# Patient Record
Sex: MALE | Race: WHITE | NOT HISPANIC OR LATINO | Employment: FULL TIME | URBAN - METROPOLITAN AREA
[De-identification: names, ages, dates, MRNs, and addresses within clinical notes are randomized per-mention and may not be internally consistent; named-entity substitution may affect disease eponyms.]

---

## 2017-10-10 ENCOUNTER — HOSPITAL ENCOUNTER (EMERGENCY)
Facility: HOSPITAL | Age: 27
Discharge: HOME | End: 2017-10-10
Attending: EMERGENCY MEDICINE | Admitting: EMERGENCY MEDICINE
Payer: COMMERCIAL

## 2017-10-10 ENCOUNTER — APPOINTMENT (EMERGENCY)
Dept: RADIOLOGY | Facility: HOSPITAL | Age: 27
End: 2017-10-10
Payer: COMMERCIAL

## 2017-10-10 ENCOUNTER — APPOINTMENT (EMERGENCY)
Dept: CT IMAGING | Facility: HOSPITAL | Age: 27
End: 2017-10-10
Payer: COMMERCIAL

## 2017-10-10 VITALS
RESPIRATION RATE: 18 BRPM | WEIGHT: 218.92 LBS | SYSTOLIC BLOOD PRESSURE: 119 MMHG | OXYGEN SATURATION: 98 % | HEIGHT: 72 IN | HEART RATE: 99 BPM | TEMPERATURE: 99.5 F | BODY MASS INDEX: 29.65 KG/M2 | DIASTOLIC BLOOD PRESSURE: 58 MMHG

## 2017-10-10 DIAGNOSIS — K62.89 RECTAL PAIN: Primary | ICD-10-CM

## 2017-10-10 LAB
ATRIAL RATE: 124 BPM
P AXIS: 65 DEGREES
PR INTERVAL: 162 MS
QRS AXIS: 86 DEGREES
QRSD INTERVAL: 84 MS
QT INTERVAL: 290 MS
QTC INTERVAL: 416 MS
T WAVE AXIS: 66 DEGREES
VENTRICULAR RATE: 124 BPM

## 2017-10-10 PROCEDURE — 93005 ELECTROCARDIOGRAM TRACING: CPT

## 2017-10-10 PROCEDURE — 72170 X-RAY EXAM OF PELVIS: CPT

## 2017-10-10 PROCEDURE — 74176 CT ABD & PELVIS W/O CONTRAST: CPT

## 2017-10-10 PROCEDURE — 99284 EMERGENCY DEPT VISIT MOD MDM: CPT
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
## 2017-10-10 NOTE — ED NOTES
Pt laying on left side, now c/o 5/10 rectal pain  Pt stated "I know its still in there, I even felt the tip myself"  Sinus Tach on monitor  Awaiting colonrectal surgeon call back   Will continue to monitor     Keturah Siddiqui RN  10/10/17 4605

## 2017-10-10 NOTE — ED NOTES
Pt provided verbal understanding of all discharge instructions   Pt ambulated to waiting room-steady gait noted     Dylan Lindo RN  10/10/17 9903

## 2017-10-10 NOTE — DISCHARGE INSTRUCTIONS
Rectal Pain   WHAT YOU NEED TO KNOW:   Rectal pain can be caused by a number of conditions, such as hemorrhoids, an abscess, trauma, or anal tear  Infection, muscle spasms, or anal intercourse can also cause rectal pain  DISCHARGE INSTRUCTIONS:   Medicines: You may need any of the following:  · NSAIDs , such as ibuprofen, help decrease swelling, pain, and fever  This medicine is available with or without a doctor's order  NSAIDs can cause stomach bleeding or kidney problems in certain people  If you take blood thinner medicine, always ask your healthcare provider if NSAIDs are safe for you  Always read the medicine label and follow directions  · Prescription pain medicine  may be given  Ask how to take this medicine safely  · Antibiotics  help treat or prevent a bacterial infection  · Bowel movement softeners  help soften your bowel movement  They help prevent straining and more damage to the area  · Take your medicine as directed  Contact your healthcare provider if you think your medicine is not helping or if you have side effects  Tell him or her if you are allergic to any medicine  Keep a list of the medicines, vitamins, and herbs you take  Include the amounts, and when and why you take them  Bring the list or the pill bottles to follow-up visits  Carry your medicine list with you in case of an emergency  Return to the emergency department if:   · You have severe pain  Contact your healthcare provider if:   · Your pain does not decrease after 1 to 2 days of treatment  · You cannot take the medicine prescribed for your condition  · You have questions or concerns about your condition or care  Take a sitz bath:  Fill a bathtub with 4 to 6 inches of warm water  You may also use a sitz bath pan that fits over a toilet  Sit in the sitz bath for 20 minutes  Do this 2 to 3 times a day, or as directed  The warm water can help decrease pain, muscle spasms, or swelling     Apply heat:  Apply a warm, moist compress on your anus for 20 to 30 minutes every 2 hours for as many days as directed  Heat helps decrease pain and muscle spasms  Eat high-fiber foods: This will help prevent constipation and soften your bowel movements  High-fiber foods include fruit, vegetables, whole-grain breads and cereals, and beans  A dietitian or healthcare provider can help you create a high-fiber meal plan  Drink liquids as directed: You may need to drink more liquid than usual to help soften your bowel movements  Ask how much liquid to drink each day and which liquids are best for you  Follow up with your healthcare provider as directed:  Write down your questions so you remember to ask them during your visits  © 2017 [facility] Information is for End User's use only and may not be sold, redistributed or otherwise used for commercial purposes  All illustrations and images included in CareNotes® are the copyrighted property of [employer]  or [employer]  The above information is an  only  It is not intended as medical advice for individual conditions or treatments  Talk to your doctor, nurse or pharmacist before following any medical regimen to see if it is safe and effective for you

## 2020-01-04 ENCOUNTER — HOSPITAL ENCOUNTER (EMERGENCY)
Facility: HOSPITAL | Age: 30
Discharge: HOME | End: 2020-01-04
Attending: EMERGENCY MEDICINE | Admitting: EMERGENCY MEDICINE
Payer: COMMERCIAL

## 2020-01-04 VITALS
DIASTOLIC BLOOD PRESSURE: 76 MMHG | WEIGHT: 200 LBS | SYSTOLIC BLOOD PRESSURE: 131 MMHG | TEMPERATURE: 98 F | HEART RATE: 83 BPM | OXYGEN SATURATION: 99 % | BODY MASS INDEX: 28 KG/M2 | RESPIRATION RATE: 18 BRPM | HEIGHT: 71 IN

## 2020-01-04 DIAGNOSIS — T15.91XA FOREIGN BODY OF RIGHT EYE: Primary | ICD-10-CM

## 2020-01-04 DIAGNOSIS — S05.00XA CORNEAL ABRASION: ICD-10-CM

## 2020-01-04 PROCEDURE — 99284 EMERGENCY DEPT VISIT MOD MDM: CPT | Performed by: EMERGENCY MEDICINE

## 2020-01-04 PROCEDURE — 99283 EMERGENCY DEPT VISIT LOW MDM: CPT

## 2020-01-04 RX ORDER — OFLOXACIN 3 MG/ML
1 SOLUTION/ DROPS OPHTHALMIC ONCE
Status: COMPLETED | OUTPATIENT
Start: 2020-01-04 | End: 2020-01-04

## 2020-01-04 RX ORDER — ALBUTEROL SULFATE 90 UG/1
2 AEROSOL, METERED RESPIRATORY (INHALATION) EVERY 6 HOURS PRN
COMMUNITY

## 2020-01-04 RX ORDER — CLONAZEPAM 1 MG/1
1 TABLET ORAL DAILY PRN
COMMUNITY

## 2020-01-04 RX ORDER — OFLOXACIN 3 MG/ML
1 SOLUTION/ DROPS OPHTHALMIC 4 TIMES DAILY
Qty: 5 ML | Refills: 0 | Status: SHIPPED | OUTPATIENT
Start: 2020-01-04 | End: 2020-01-09

## 2020-01-04 RX ADMIN — OFLOXACIN 1 DROP: 3 SOLUTION/ DROPS OPHTHALMIC at 09:04

## 2020-01-04 RX ADMIN — FLUORESCEIN SODIUM 1 STRIP: 1 STRIP OPHTHALMIC at 09:04

## 2020-01-04 NOTE — DISCHARGE INSTRUCTIONS
ED to follow up with an ophthalmologist   Mason Zuñiga do have a corneal abrasion along with a rust ring development    That needs to be followed up with a specialist   If any worsening of symptoms come back to emergency department next

## 2020-01-04 NOTE — ED PROVIDER NOTES
History  Chief Complaint   Patient presents with    Foreign Body in [address] was cutting scrap metal and got a piece in his rt eye  FB sensation in same  HPI    34 year male that presents today with a metal in his right eye  Patient states he was working with metal and felt a piece go in his eye  Patient states visible when he looks in the mirror  Patient states it is irritating  Denies any blurry vision  Patient states he does not wear any contacts  34 year male that presents with a metal in his right eye  Successfully removed with Q-tip along with a 27 gauge needle  Recommended that patient follows up with Ophthalmology    Prior to Admission Medications   Prescriptions Last Dose Informant Patient Reported? Taking? albuterol (PROVENTIL HFA,VENTOLIN HFA) 90 mcg/act inhaler   Yes Yes   Sig: Inhale 2 puffs every 6 (six) hours as needed for wheezing   clonazePAM (KlonoPIN) 1 mg tablet   Yes Yes   Sig: Take 1 mg by mouth daily as needed for seizures      Facility-Administered Medications: None       Past Medical History:   Diagnosis Date    Asthma        History reviewed  No pertinent surgical history  History reviewed  No pertinent family history  I have reviewed and agree with the history as documented  Social History     Tobacco Use    Smoking status: Current Every Day Smoker     Packs/day: 1 00    Smokeless tobacco: Never Used   Substance Use Topics    Alcohol use: Yes     Comment: weekends    Drug use: Yes     Types: Marijuana        Review of Systems   Constitutional: Negative  Negative for diaphoresis and fever  HENT: Negative  Eyes: Positive for pain and redness  Negative for visual disturbance  Respiratory: Negative  Negative for cough, shortness of breath and wheezing  Cardiovascular: Negative  Negative for chest pain, palpitations and leg swelling  Gastrointestinal: Negative for abdominal distention, abdominal pain, nausea and vomiting     Genitourinary: Negative  Musculoskeletal: Negative  Skin: Negative  Neurological: Negative  Psychiatric/Behavioral: Negative  All other systems reviewed and are negative  Physical Exam  Physical Exam   Constitutional: He is oriented to person, place, and time  He appears well-developed and well-nourished  No distress  HENT:   Head: Normocephalic and atraumatic  Nose: Nose normal    Mouth/Throat: Oropharynx is clear and moist    Eyes: Pupils are equal, round, and reactive to light  Conjunctivae and EOM are normal        Small spot of metal right eye   Neck: Normal range of motion  Neck supple  Cardiovascular: Normal rate, regular rhythm and normal heart sounds  No murmur heard  Pulmonary/Chest: Effort normal and breath sounds normal  No respiratory distress  He has no wheezes  He has no rales  Abdominal: Soft  Bowel sounds are normal  He exhibits no distension  There is no tenderness  There is no rebound and no guarding  Musculoskeletal: Normal range of motion  He exhibits no edema, tenderness or deformity  Neurological: He is alert and oriented to person, place, and time  No cranial nerve deficit  Skin: Skin is warm and dry  No rash noted  He is not diaphoretic  No pallor  Psychiatric: He has a normal mood and affect  Vitals reviewed        Vital Signs  ED Triage Vitals   Temperature Pulse Respirations Blood Pressure SpO2   01/04/20 0801 01/04/20 0801 01/04/20 0801 01/04/20 0801 01/04/20 0801   98 °F (36 7 °C) 83 18 131/76 99 %      Temp Source Heart Rate Source Patient Position - Orthostatic VS BP Location FiO2 (%)   01/04/20 0801 01/04/20 0801 01/04/20 0801 01/04/20 0801 --   Tympanic Monitor Lying Left arm       Pain Score       01/04/20 0901       No Pain           Vitals:    01/04/20 0801   BP: 131/76   Pulse: 83   Patient Position - Orthostatic VS: Lying         Visual Acuity  Visual Acuity      Most Recent Value   Visual acuity R eye is  20/30   Visual acuity Left eye is  20/20 Visual acuity in both eyes is  20/20   Wearing corrective eyewear/lenses? No   No corrective eyewear/lenses  Yes          ED Medications  Medications   ofloxacin (OCUFLOX) 0 3 % ophthalmic solution 1 drop (1 drop Right Eye Given 1/4/20 0904)   fluorescein sodium sterile ophthalmic strip 1 strip (1 strip Right Eye Given 1/4/20 0904)       Diagnostic Studies  Results Reviewed     None                 No orders to display              Procedures  Foreign Body - Ocular  Date/Time: 1/4/2020 9:00 AM  Performed by: Nicki Luna MD  Authorized by: Nicki Luna MD     Patient location:  ED  Consent:     Consent obtained:  Verbal    Consent given by:  Patient    Risks discussed:  Bleeding, globe perforation, pain, visual impairment, incomplete removal, corneal damage, damage to surrounding structures, infection and worsening of condition    Alternatives discussed:  No treatment, delayed treatment, observation and referral  Universal protocol:     Patient identity confirmed:  Verbally with patient  Location:     Location:  [facility]    Depth:  Embedded  Pre-procedure details:     Imaging:  None    Fluorescein exam: yes      Fluorescein uptake: yes    Anesthesia (see MAR for exact dosages):     Local anesthetic:  Tetracaine drops  Procedure details:     Localization method:  Direct visualization    Removal mechanism:  27 G needle    Foreign bodies recovered:  1    Intact foreign body removal: yes      Residual rust ring observed: yes      Residual rust ring removed: no    Post-procedure details:     Confirmation:  No additional foreign bodies on visualization    Dressing:  Antibiotic ointment    Patient tolerance of procedure: Tolerated well, no immediate complications             ED Course  ED Course as of Jan 04 1353   Sat Jan 04, 2020   0906 Metal removed       0845 Removed with a Q-tip moist along with a 27 gauge needle    Successfully removed a rust ring is present discussed with patient following up with ophthalmologist   Will write for antibiotics for the eye      0907 Corneal abrasion will give antibiotics                                   MDM      Disposition  Final diagnoses:   Foreign body of right eye   Corneal abrasion     Time reflects when diagnosis was documented in both MDM as applicable and the Disposition within this note     Time User Action Codes Description Comment    1/4/2020  9:08 AM Ilir Farris Add Bhavik Mail Foreign body of right eye     1/4/2020  9:08 AM Ilir Farris Add [S05 00XA] Corneal abrasion       ED Disposition     ED Disposition Condition Date/Time Comment    Discharge Stable Sat Jan 4, 2020  9:07 AM Ivie Baumgarten discharge to home/self care  Follow-up Information     Follow up With Specialties Details Why [address] Ophthalmology Schedule an appointment as soon as possible for a visit   [address]  [phone]            Discharge Medication List as of 1/4/2020  9:09 AM      START taking these medications    Details   ofloxacin (OCUFLOX) 0 3 % ophthalmic solution Administer 1 drop to the right eye 4 (four) times a day for 5 days, Starting Sat 1/4/2020, Until Thu 1/9/2020, Print         CONTINUE these medications which have NOT CHANGED    Details   albuterol (PROVENTIL HFA,VENTOLIN HFA) 90 mcg/act inhaler Inhale 2 puffs every 6 (six) hours as needed for wheezing, Historical Med      clonazePAM (KlonoPIN) 1 mg tablet Take 1 mg by mouth daily as needed for seizures, Historical Med           No discharge procedures on file      ED Provider  Electronically Signed by           Rob Clifton MD  01/04/20 5669

## 2021-03-26 ENCOUNTER — OFFICE VISIT (OUTPATIENT)
Dept: URGENT CARE | Facility: CLINIC | Age: 31
End: 2021-03-26
Payer: COMMERCIAL

## 2021-03-26 VITALS
BODY MASS INDEX: 28.58 KG/M2 | SYSTOLIC BLOOD PRESSURE: 145 MMHG | HEART RATE: 74 BPM | HEIGHT: 72 IN | OXYGEN SATURATION: 99 % | RESPIRATION RATE: 16 BRPM | DIASTOLIC BLOOD PRESSURE: 65 MMHG | TEMPERATURE: 98.1 F | WEIGHT: 211 LBS

## 2021-03-26 DIAGNOSIS — L02.414 ABSCESS OF LEFT ARM: Primary | ICD-10-CM

## 2021-03-26 PROCEDURE — 99213 OFFICE O/P EST LOW 20 MIN: CPT | Performed by: PHYSICIAN ASSISTANT

## 2021-03-26 RX ORDER — SULFAMETHOXAZOLE AND TRIMETHOPRIM 800; 160 MG/1; MG/1
1 TABLET ORAL EVERY 12 HOURS SCHEDULED
Qty: 20 TABLET | Refills: 0 | Status: SHIPPED | OUTPATIENT
Start: 2021-03-26 | End: 2021-03-26 | Stop reason: SDUPTHER

## 2021-03-26 RX ORDER — SULFAMETHOXAZOLE AND TRIMETHOPRIM 800; 160 MG/1; MG/1
1 TABLET ORAL EVERY 12 HOURS SCHEDULED
Qty: 20 TABLET | Refills: 0 | Status: SHIPPED | OUTPATIENT
Start: 2021-03-26 | End: 2021-04-05

## 2021-03-26 NOTE — PATIENT INSTRUCTIONS
Abscess of left arm related to IV drug use  rx bactrim DS twice daily x 10 days sent via EMR  Warm compress  Tylenol/ibuprofen as needed for pain/fever    Follow up with PCP in 3-5 days  Proceed to  ER if symptoms worsen  Abscess   WHAT YOU NEED TO KNOW:   A warm compress may help your abscess drain  Your healthcare provider may make a cut in the abscess so it can drain  You may need surgery to remove an abscess that is on your hands or buttocks  DISCHARGE INSTRUCTIONS:   Return to the emergency department if:   · The area around your abscess becomes very painful, warm, or has red streaks  · You have a fever and chills  · Your heart is beating faster than usual      · You feel faint or confused  Contact your healthcare provider if:   · Your abscess gets bigger or does not get better  · Your abscess returns  · You have questions or concerns about your condition or care  Medicines: You may  need any of the following:  · Antibiotics  help treat a bacterial infection  · Acetaminophen  decreases pain and fever  It is available without a doctor's order  Ask how much to take and how often to take it  Follow directions  Acetaminophen can cause liver damage if not taken correctly  · NSAIDs , such as ibuprofen, help decrease swelling, pain, and fever  This medicine is available with or without a doctor's order  NSAIDs can cause stomach bleeding or kidney problems in certain people  If you take blood thinner medicine, always ask your healthcare provider if NSAIDs are safe for you  Always read the medicine label and follow directions  · Take your medicine as directed  Contact your healthcare provider if you think your medicine is not helping or if you have side effects  Tell him or her if you are allergic to any medicine  Keep a list of the medicines, vitamins, and herbs you take  Include the amounts, and when and why you take them  Bring the list or the pill bottles to follow-up visits  Carry your medicine list with you in case of an emergency  Self-care:   · Apply a warm compress to your abscess  This will help it open and drain  Wet a washcloth in warm, but not hot, water  Apply the compress for 10 minutes  Repeat this 4 times each day  Do not  press on an abscess or try to open it with a needle  You may push the bacteria deeper or into your blood  · Do not share your clothes, towels, or sheets with anyone  This can spread the infection to others  · Wash your hands often  This can help prevent the spread of germs  Use soap and water or an alcohol-based hand rub  Care for your wound after it is drained:   · Care for your wound as directed  If your healthcare provider says it is okay, carefully remove the bandage and gauze packing  You may need to soak the gauze to get it out of your wound  Clean your wound and the area around it as directed  Dry the area and put on new, clean bandages  Change your bandages when they get wet or dirty  · Ask your healthcare provider how to change the gauze in your wound  Keep track of how many pieces of gauze are placed inside the wound  Do not put too much packing in the wound  Do not pack the gauze too tightly in your wound  Follow up with your healthcare provider in 1 to 3 days: You may need to have your packing removed or your bandage changed  Write down your questions so you remember to ask them during your visits  © Copyright [address] Information is for End User's use only and may not be sold, redistributed or otherwise used for commercial purposes  All illustrations and images included in CareNotes® are the copyrighted property of [employer] M , Inc  or [facility] Gerri Tam   The above information is an  only  It is not intended as medical advice for individual conditions or treatments  Talk to your doctor, nurse or pharmacist before following any medical regimen to see if it is safe and effective for you

## 2021-03-26 NOTE — PROGRESS NOTES
[facility]        NAME: Rosita Garcia is a 31 y o  male  : 1990    MRN: [id number]  DATE: 2021  TIME: 10:14 AM    Assessment and Plan   Abscess of left arm [L02 414]  1  Abscess of left arm  sulfamethoxazole-trimethoprim (BACTRIM DS) 800-160 mg per tablet     Patient Instructions   Abscess of left arm related to IV drug use  rx bactrim DS twice daily x 10 days sent via EMR  Warm compress  Tylenol/ibuprofen as needed for pain/fever    Follow up with PCP in 3-5 days  Proceed to  ER if symptoms worsen  Chief Complaint     Chief Complaint   Patient presents with    Abscess     Pt reports of abscess on left forearm started approx 1 week ago  History of Present Illness         Ron Chan is a 28-year-old male who presents to clinic with complaints of left forearm swelling  And redness x1 week  He states that it began as a tiny pimple like lesion and has progressively increased in size over the last week  He is also notes it is become more painful over that same week  He states that it is hard and red now overlying it  He denies any fever, chills, or known bite  He does admit to IV drug use in that vicinity on his left forearm  He states that the needle was clean and does not sure needles  He denies any fever, chills, chest pain, or shortness of breath  Review of Systems   Review of Systems   Constitutional: Negative for chills and fever  Respiratory: Negative for shortness of breath  Cardiovascular: Negative for chest pain  Skin: Positive for color change  Negative for wound       Current Medications       Current Outpatient Medications:     albuterol (PROVENTIL HFA,VENTOLIN HFA) 90 mcg/act inhaler, Inhale 2 puffs every 6 (six) hours as needed for wheezing, Disp: , Rfl:     clonazePAM (KlonoPIN) 1 mg tablet, Take 1 mg by mouth daily as needed for seizures, Disp: , Rfl:     sulfamethoxazole-trimethoprim (BACTRIM DS) 800-160 mg per tablet, Take 1 tablet by mouth every 12 (twelve) hours for 10 days, Disp: 20 tablet, Rfl: 0    Current Allergies     Allergies as of 03/26/2021    (No Known Allergies)            The following portions of the patient's history were reviewed and updated as appropriate: allergies, current medications, past family history, past medical history, past social history, past surgical history and problem list      Past Medical History:   Diagnosis Date    ADHD (attention deficit hyperactivity disorder)     Asthma        History reviewed  No pertinent surgical history  History reviewed  No pertinent family history  Medications have been verified  Objective   /65   Pulse 74   Temp 98 1 °F (36 7 °C)   Resp 16   Ht 6' (1 829 m)   Wt 95 7 kg (211 lb)   SpO2 99%   BMI 28 62 kg/m²   No LMP for male patient  Physical Exam     Physical Exam  Vitals signs and nursing note reviewed  Constitutional:       General: He is not in acute distress  Appearance: Normal appearance  He is not ill-appearing  Cardiovascular:      Rate and Rhythm: Normal rate and regular rhythm  Heart sounds: Normal heart sounds  Pulmonary:      Effort: Pulmonary effort is normal       Breath sounds: Normal breath sounds  Musculoskeletal:        Arms:    Neurological:      Mental Status: He is alert and oriented to person, place, and time     Psychiatric:         Mood and Affect: Mood normal          Behavior: Behavior normal

## 2021-04-06 ENCOUNTER — OFFICE VISIT (OUTPATIENT)
Dept: URGENT CARE | Facility: CLINIC | Age: 31
End: 2021-04-06
Payer: COMMERCIAL

## 2021-04-06 VITALS
HEIGHT: 72 IN | OXYGEN SATURATION: 100 % | HEART RATE: 82 BPM | RESPIRATION RATE: 18 BRPM | WEIGHT: 212.6 LBS | BODY MASS INDEX: 28.79 KG/M2 | TEMPERATURE: 97.5 F | SYSTOLIC BLOOD PRESSURE: 131 MMHG | DIASTOLIC BLOOD PRESSURE: 75 MMHG

## 2021-04-06 DIAGNOSIS — R22.0 LIP SWELLING: ICD-10-CM

## 2021-04-06 DIAGNOSIS — T78.40XA ALLERGIC REACTION, INITIAL ENCOUNTER: Primary | ICD-10-CM

## 2021-04-06 PROCEDURE — 99213 OFFICE O/P EST LOW 20 MIN: CPT | Performed by: PHYSICIAN ASSISTANT

## 2021-04-06 RX ORDER — PREDNISONE 50 MG/1
50 TABLET ORAL DAILY
Qty: 5 TABLET | Refills: 0 | Status: SHIPPED | OUTPATIENT
Start: 2021-04-06 | End: 2021-04-11

## 2021-04-06 NOTE — PROGRESS NOTES
[facility]        NAME: Raissa Dave is a 31 y o  male  : 1990    MRN: [id number]  DATE: 2021  TIME: 9:55 AM    Assessment and Plan   Allergic reaction, initial encounter [T78 40XA]  1  Allergic reaction, initial encounter  predniSONE 50 mg tablet   2  Lip swelling           Patient Instructions   Allergic reaction  rx prednisone once daily x 5 days sent via EMR, do not take at night  Can continue benadryl at night as needed  vaseline to lips as needed    Follow up with PCP in 3-5 days  Proceed to  ER if symptoms worsen  Chief Complaint     Chief Complaint   Patient presents with    Allergic Reaction     allergic reaction from unknown food? or enviorment on   Lips swollen begining  night now still swollen and excoriated  Took benadryl and ceflofloxin           History of Present Illness         Layton Pollard is a 72-year-old male who presents to clinic complaining of which lip swelling x3 days  He states that he started with the upper and lower lip swelling after  dinner on   He denies any new foods or medications  Prior to the swelling starting  He states that as a kid he got hives frequently for various allergies  Her pain notes the swelling is slightly present improved but now his lips are chapped and excoriated  He states that they are very itchy  He took Benadryl last night with some improvement  Review of Systems   Review of Systems   Constitutional: Negative  HENT: Positive for facial swelling  Respiratory: Negative for choking, shortness of breath, wheezing and stridor        Current Medications       Current Outpatient Medications:     clonazePAM (KlonoPIN) 1 mg tablet, Take 1 mg by mouth daily as needed for seizures, Disp: , Rfl:     albuterol (PROVENTIL HFA,VENTOLIN HFA) 90 mcg/act inhaler, Inhale 2 puffs every 6 (six) hours as needed for wheezing, Disp: , Rfl:     predniSONE 50 mg tablet, Take 1 tablet (50 mg total) by mouth daily for 5 days, Disp: 5 tablet, Rfl: 0    Current Allergies     Allergies as of 04/06/2021    (No Known Allergies)            The following portions of the patient's history were reviewed and updated as appropriate: allergies, current medications, past family history, past medical history, past social history, past surgical history and problem list      Past Medical History:   Diagnosis Date    ADHD (attention deficit hyperactivity disorder)     Asthma        No past surgical history on file  No family history on file  Medications have been verified  Objective   /75   Pulse 82   Temp 97 5 °F (36 4 °C)   Resp 18   Ht 6' (1 829 m)   Wt 96 4 kg (212 lb 9 6 oz)   SpO2 100%   BMI 28 83 kg/m²   No LMP for male patient  Physical Exam     Physical Exam  Vitals signs and nursing note reviewed  Constitutional:       General: He is not in acute distress  Appearance: Normal appearance  He is not ill-appearing  HENT:      Mouth/Throat:      Lips: Lesions (upper and lower lip excoriations and scabbed, +some edema as well, no signs of infection or bleeding) present  Cardiovascular:      Rate and Rhythm: Normal rate and regular rhythm  Heart sounds: Normal heart sounds  Pulmonary:      Effort: Pulmonary effort is normal       Breath sounds: Normal breath sounds  Neurological:      Mental Status: He is alert and oriented to person, place, and time     Psychiatric:         Mood and Affect: Mood normal          Behavior: Behavior normal

## 2021-04-06 NOTE — LETTER
April 6, 2021     Patient: Raissa Dave   YOB: 1990   Date of Visit: 4/6/2021       To Whom It May Concern: It is my medical opinion that Raissa Dave may return to work on 4/7/21  If you have any questions or concerns, please don't hesitate to call           Sincerely,        Dave Nava PA-C    CC: No Recipients

## 2021-04-06 NOTE — PATIENT INSTRUCTIONS
Allergic reaction  rx prednisone once daily x 5 days sent via EMR, no not take at night  Can continue benadryl at night as needed  vaseline to lips as needed    Follow up with PCP in 3-5 days  Proceed to  ER if symptoms worsen  General Allergic Reaction   WHAT YOU NEED TO KNOW:   An allergic reaction is your body's response to an allergen  Allergens include medicines, food, insect stings, animal dander, mold, latex, chemicals, and dust mites  Pollen from trees, grass, and weeds can also cause an allergic reaction  An allergic reaction can range from mild to severe  DISCHARGE INSTRUCTIONS:   Call 911 for signs or symptoms of anaphylaxis,  such as trouble breathing, swelling in your mouth or throat, or wheezing  You may also have itching, a rash, hives, or feel like you are going to faint  Return to the emergency department if:   · You have a skin rash, hives, swelling, or itching that is starting to get worse  · Your throat tightens, or your lips or tongue swell  · You have trouble swallowing or speaking  · You have worsening nausea, diarrhea, or abdominal cramps, or you are vomiting  · You have chest pain or tightness  Contact your healthcare provider if:   · You have questions or concerns about your condition or care  Medicines: You may need any of the following:  · Medicines  may be given to relieve certain allergy symptoms such as itching, sneezing, and swelling  You may take them as a pill or use drops in your nose or eyes  Topical treatments may be given to put directly on your skin to help decrease itching or swelling  · Epinephrine  may be prescribed if you are at risk for anaphylaxis  This is a severe allergic reaction that can be life-threatening  Your healthcare provider will tell you if you need to keep epinephrine with you  You will be taught when and how to use it  · Take your medicine as directed    Contact your healthcare provider if you think your medicine is not helping or if you have side effects  Tell him of her if you are allergic to any medicine  Keep a list of the medicines, vitamins, and herbs you take  Include the amounts, and when and why you take them  Bring the list or the pill bottles to follow-up visits  Carry your medicine list with you in case of an emergency  Follow up with your healthcare provider as directed:  Write down your questions so you remember to ask them during your visits  Manage your symptoms:   · Avoid allergens  You may need to have allergy testing with your healthcare provider or a specialist to find your allergens  · Use cold compresses on your skin or eyes  This will help soothe skin or eyes affected by the allergic reaction  You can make a cold compress by soaking a washcloth in cool water  Wring out the extra water before you apply the washcloth  · Rinse your nasal passages with a saline solution  Daily rinsing may help clear allergens out of your nose  Use distilled water if possible  You can also boil tap water and then let it cool before you use it  Do not use tap water without boiling it first     · Do not smoke  Nicotine and other chemicals in cigarettes and cigars can make an allergic reaction worse, and can also cause lung damage  Ask your healthcare provider for information if you currently smoke and need help to quit  E-cigarettes or smokeless tobacco still contain nicotine  Talk to your healthcare provider before you use these products  © Copyright [address] Information is for End User's use only and may not be sold, redistributed or otherwise used for commercial purposes  All illustrations and images included in CareNotes® are the copyrighted property of A D A M , Inc  or [address] [place] [address] [place]   The above information is an  only  It is not intended as medical advice for individual conditions or treatments   Talk to your doctor, nurse or pharmacist before following any medical regimen to see if it is safe and effective for you

## 2021-05-25 ENCOUNTER — TRANSCRIBE ORDERS (OUTPATIENT)
Dept: ADMINISTRATIVE | Facility: HOSPITAL | Age: 31
End: 2021-05-25

## 2021-05-25 ENCOUNTER — LAB (OUTPATIENT)
Dept: LAB | Facility: HOSPITAL | Age: 31
End: 2021-05-25
Payer: COMMERCIAL

## 2021-05-25 DIAGNOSIS — Z31.441 ENCOUNTER FOR TESTING OF MALE PARTNER OF FEMALE WITH RECURRENT PREGNANCY LOSS: Primary | ICD-10-CM

## 2021-05-25 DIAGNOSIS — Z31.441 ENCOUNTER FOR TESTING OF MALE PARTNER OF FEMALE WITH RECURRENT PREGNANCY LOSS: ICD-10-CM

## 2021-05-25 LAB
ABO GROUP BLD: NORMAL
BLD GP AB SCN SERPL QL: NEGATIVE
RH BLD: POSITIVE
SPECIMEN EXPIRATION DATE: NORMAL

## 2021-05-25 PROCEDURE — 86850 RBC ANTIBODY SCREEN: CPT

## 2021-05-25 PROCEDURE — 86900 BLOOD TYPING SEROLOGIC ABO: CPT

## 2021-05-25 PROCEDURE — 86901 BLOOD TYPING SEROLOGIC RH(D): CPT

## 2021-05-25 PROCEDURE — 36415 COLL VENOUS BLD VENIPUNCTURE: CPT

## 2021-07-19 ENCOUNTER — HOSPITAL ENCOUNTER (EMERGENCY)
Facility: HOSPITAL | Age: 31
Discharge: HOME | End: 2021-07-19
Attending: EMERGENCY MEDICINE | Admitting: EMERGENCY MEDICINE
Payer: COMMERCIAL

## 2021-07-19 VITALS
BODY MASS INDEX: 27.12 KG/M2 | TEMPERATURE: 98.4 F | SYSTOLIC BLOOD PRESSURE: 146 MMHG | RESPIRATION RATE: 14 BRPM | DIASTOLIC BLOOD PRESSURE: 92 MMHG | HEART RATE: 78 BPM | OXYGEN SATURATION: 100 % | WEIGHT: 200 LBS

## 2021-07-19 DIAGNOSIS — T40.1X1A HEROIN OVERDOSE (HCC): Primary | ICD-10-CM

## 2021-07-19 PROCEDURE — 99284 EMERGENCY DEPT VISIT MOD MDM: CPT | Performed by: PHYSICIAN ASSISTANT

## 2021-07-19 PROCEDURE — 99284 EMERGENCY DEPT VISIT MOD MDM: CPT

## 2021-07-19 RX ORDER — ONDANSETRON 2 MG/ML
1 INJECTION INTRAMUSCULAR; INTRAVENOUS ONCE
Status: COMPLETED | OUTPATIENT
Start: 2021-07-19 | End: 2021-07-19

## 2021-07-19 RX ORDER — NALOXONE HYDROCHLORIDE 1 MG/ML
1 INJECTION PARENTERAL ONCE
Status: COMPLETED | OUTPATIENT
Start: 2021-07-19 | End: 2021-07-19

## 2021-07-19 NOTE — ED PROVIDER NOTES
HPI: Patient is a 36 y o  male who presents with 1 days of drug overdose  which the patient describes at mild The patient has been given narcan  Patient nasal Heroin  Weekly use of opiates  4 bags per week typically and 4 bags used today  This was not an intentional overdose but recreational no plans or thoughts of suicide  Social History     Substance and Sexual Activity   Drug Use Yes    Types: Marijuana, Heroin      Nursing notes reviewed  Physical Exam:  ED Triage Vitals [07/19/21 1938]   Temperature Pulse Respirations Blood Pressure SpO2   98 4 °F (36 9 °C) 71 14 146/92 100 %      Temp Source Heart Rate Source Patient Position - Orthostatic VS BP Location FiO2 (%)   Tympanic Monitor Sitting Left arm --      Pain Score       --           ROS: Positive for heroin abuse, the remainder of a 10 organ system ROS was otherwise unremarkable  General: awake, alert, no acute distress    Head: normocephalic, atraumatic    Eyes: no scleral icterus  Ears: external ears normal, hearing grossly intact  Nose: external exam grossly normal, Negative nasal discharge  Neck: symmetric, No JVD noted, trachea midline  Pulmonary: no respiratory distress, no tachypnea noted  Cardiovascular: appears well perfused  Abdomen: no distention noted  Musculoskeletal: no deformities noted, tone normal  Neuro: grossly non-focal  Psych: mood and affect appropriate      Discussed with patient about their substance abuse, offered rehabilitation services to the patient including host referral and warm handoff  Patient declines at this point time but is requesting outpatient resources  Counseled on the importance sobriety and following up as outpatient  Patient was observed in the emergency department after receiving pre-hospital naloxone  The patient was medically stable for discharge after a period of 1 hour of observation    Patient was deemed low risk for adverse events after naloxone administration because they met following six criteria based upon Hospital Observation Upon Reversal(HOUR) rule:    Can mobilize as usual  Have a normal [id number] (>95%)  Have a normal respiratory rate (>10 and <20 breaths/min)  Have a normal temperature (>35 0 and <37 5°C)  Have a normal heart rate (>50 and <100 beats/min)  Have a GCS score of 15    [facility]  2019 Jan;26(1):7-15  doi: 10 1111/acem  [id number]  Epub 2018 Dec 28  Procedure  Procedures    ED Course as of Jul 21 0806 Mon Jul 19, 2021 2003 Patient signed out pending 26 more minutes of observation          Pt re-examined and evaluated after testing and treatment  Spoke with the patient and feeling improved and sxs have resolved  Will discharge home with close f/u with pcp and instructed to return to the ED if sxs worsen or continue  Pt agrees with the plan for discharge and feels comfortable to go home with proper f/u  Advised to return for worsening or additional problems  Diagnostic tests were reviewed and questions answered  Diagnosis, care plan and treatment options were discussed  The patient understand instructions and will follow up as directed  Counseling: I had a detailed discussion with the patient and/or guardian regarding: the historical points, exam findings, and any diagnostic results supporting the discharge diagnosis, lab results, radiology results, discharge instructions reviewed with patient and/or family/caregiver and understanding was verbalized  Instructions given to return to the emergency department if symptoms worsen or persist, or if there are any questions or concerns that arise at home       All labs reviewed and utilized in the medical decision making process    All radiology studies independently viewed by me and interpreted by the radiologist       Medications   naloxone (FOR EMS ONLY) [facility]) 2 MG/2ML injection 2 mg (0 mg Does not apply Given to EMS 7/19/21 1934)   ondansetron (FOR EMS ONLY) (ZOFRAN) 4 mg/2 mL injection 4 mg (0 mg Does not apply Given to EMS 7/19/21 1934)     Final diagnoses:   Heroin overdose [facility])     Time reflects when diagnosis was documented in both MDM as applicable and the Disposition within this note     Time User Action Codes Description Comment    7/19/2021  7:42 PM Paul, FaisalMadelyn [facility]  Heroin overdose [facility])       ED Disposition     ED Disposition Condition Date/Time Comment    Discharge Stable Mon Jul 19, 2021  7:42 PM Yodit Cormier discharge to home/self care  Follow-up Information     Follow up With Specialties Details Why Contact Info    Laurel Zuniga DO Internal Medicine Schedule an appointment as soon as possible for a visit in 2 days As needed [address] [facility] [address]  [phone]          Discharge Medication List as of 7/19/2021  8:27 PM      CONTINUE these medications which have NOT CHANGED    Details   albuterol (PROVENTIL HFA,VENTOLIN HFA) 90 mcg/act inhaler Inhale 2 puffs every 6 (six) hours as needed for wheezing, Historical Med      clonazePAM (KlonoPIN) 1 mg tablet Take 1 mg by mouth daily as needed for seizures, Historical Med           No discharge procedures on file      Electronically Signed by         Bipin Nunez PA-C  07/21/21 9225

## 2021-11-03 ENCOUNTER — OFFICE VISIT (OUTPATIENT)
Dept: FAMILY MEDICINE CLINIC | Facility: CLINIC | Age: 31
End: 2021-11-03
Payer: COMMERCIAL

## 2021-11-03 VITALS
DIASTOLIC BLOOD PRESSURE: 82 MMHG | WEIGHT: 243.6 LBS | RESPIRATION RATE: 18 BRPM | HEIGHT: 72 IN | SYSTOLIC BLOOD PRESSURE: 130 MMHG | BODY MASS INDEX: 33 KG/M2 | HEART RATE: 80 BPM | OXYGEN SATURATION: 98 %

## 2021-11-03 DIAGNOSIS — J45.20 MILD INTERMITTENT ASTHMA WITHOUT COMPLICATION: ICD-10-CM

## 2021-11-03 DIAGNOSIS — Z00.00 ANNUAL PHYSICAL EXAM: ICD-10-CM

## 2021-11-03 DIAGNOSIS — F41.9 ANXIETY: ICD-10-CM

## 2021-11-03 DIAGNOSIS — F19.20 DRUG DEPENDENCE (HCC): ICD-10-CM

## 2021-11-03 DIAGNOSIS — F98.8 ATTENTION DEFICIT DISORDER, UNSPECIFIED HYPERACTIVITY PRESENCE: ICD-10-CM

## 2021-11-03 DIAGNOSIS — Z00.00 PREVENTATIVE HEALTH CARE: Primary | ICD-10-CM

## 2021-11-03 PROCEDURE — 3008F BODY MASS INDEX DOCD: CPT | Performed by: FAMILY MEDICINE

## 2021-11-03 PROCEDURE — 3725F SCREEN DEPRESSION PERFORMED: CPT | Performed by: FAMILY MEDICINE

## 2021-11-03 PROCEDURE — 99395 PREV VISIT EST AGE 18-39: CPT | Performed by: FAMILY MEDICINE

## 2021-11-03 PROCEDURE — 4004F PT TOBACCO SCREEN RCVD TLK: CPT | Performed by: FAMILY MEDICINE

## 2021-11-03 RX ORDER — DEXTROAMPHETAMINE SACCHARATE, AMPHETAMINE ASPARTATE, DEXTROAMPHETAMINE SULFATE AND AMPHETAMINE SULFATE 7.5; 7.5; 7.5; 7.5 MG/1; MG/1; MG/1; MG/1
30 TABLET ORAL 2 TIMES DAILY
COMMUNITY

## 2021-11-10 ENCOUNTER — TELEPHONE (OUTPATIENT)
Dept: PSYCHIATRY | Facility: CLINIC | Age: 31
End: 2021-11-10

## 2021-11-20 ENCOUNTER — HOSPITAL ENCOUNTER (EMERGENCY)
Facility: HOSPITAL | Age: 31
Discharge: HOME | End: 2021-11-20
Attending: EMERGENCY MEDICINE
Payer: COMMERCIAL

## 2021-11-20 VITALS
WEIGHT: 230 LBS | BODY MASS INDEX: 31.19 KG/M2 | TEMPERATURE: 98 F | RESPIRATION RATE: 18 BRPM | DIASTOLIC BLOOD PRESSURE: 80 MMHG | HEART RATE: 65 BPM | OXYGEN SATURATION: 99 % | SYSTOLIC BLOOD PRESSURE: 155 MMHG

## 2021-11-20 DIAGNOSIS — F19.10 POLYSUBSTANCE ABUSE (HCC): Primary | ICD-10-CM

## 2021-11-20 DIAGNOSIS — F11.23 OPIATE WITHDRAWAL (HCC): ICD-10-CM

## 2021-11-20 DIAGNOSIS — G25.81 RESTLESS LEGS: ICD-10-CM

## 2021-11-20 LAB
ALBUMIN SERPL BCP-MCNC: 4.6 G/DL (ref 3.5–5)
ALP SERPL-CCNC: 97 U/L (ref 46–116)
ALT SERPL W P-5'-P-CCNC: 32 U/L (ref 12–78)
AMPHETAMINES SERPL QL SCN: NEGATIVE
ANION GAP SERPL CALCULATED.3IONS-SCNC: 12 MMOL/L (ref 4–13)
AST SERPL W P-5'-P-CCNC: 21 U/L (ref 5–45)
BARBITURATES UR QL: NEGATIVE
BASOPHILS # BLD AUTO: 0.02 THOUSANDS/ΜL (ref 0–0.1)
BASOPHILS NFR BLD AUTO: 0 % (ref 0–1)
BENZODIAZ UR QL: POSITIVE
BILIRUB SERPL-MCNC: 0.67 MG/DL (ref 0.2–1)
BILIRUB UR QL STRIP: NEGATIVE
BUN SERPL-MCNC: 12 MG/DL (ref 5–25)
CALCIUM SERPL-MCNC: 9.5 MG/DL (ref 8.3–10.1)
CHLORIDE SERPL-SCNC: 100 MMOL/L (ref 100–108)
CLARITY UR: CLEAR
CO2 SERPL-SCNC: 27 MMOL/L (ref 21–32)
COCAINE UR QL: NEGATIVE
COLOR UR: YELLOW
CREAT SERPL-MCNC: 0.98 MG/DL (ref 0.6–1.3)
EOSINOPHIL # BLD AUTO: 0 THOUSAND/ΜL (ref 0–0.61)
EOSINOPHIL NFR BLD AUTO: 0 % (ref 0–6)
ERYTHROCYTE [DISTWIDTH] IN BLOOD BY AUTOMATED COUNT: 13.2 % (ref 11.6–15.1)
GFR SERPL CREATININE-BSD FRML MDRD: 104 ML/MIN/1.73SQ M
GLUCOSE SERPL-MCNC: 113 MG/DL (ref 65–140)
GLUCOSE UR STRIP-MCNC: NEGATIVE MG/DL
HCT VFR BLD AUTO: 43.4 % (ref 36.5–49.3)
HGB BLD-MCNC: 15 G/DL (ref 12–17)
HGB UR QL STRIP.AUTO: NEGATIVE
KETONES UR STRIP-MCNC: ABNORMAL MG/DL
LEUKOCYTE ESTERASE UR QL STRIP: NEGATIVE
LIPASE SERPL-CCNC: 40 U/L (ref 73–393)
LYMPHOCYTES # BLD AUTO: 3.32 THOUSANDS/ΜL (ref 0.6–4.47)
LYMPHOCYTES NFR BLD AUTO: 24 % (ref 14–44)
MAGNESIUM SERPL-MCNC: 2 MG/DL (ref 1.6–2.6)
MCH RBC QN AUTO: 31.3 PG (ref 26.8–34.3)
MCHC RBC AUTO-ENTMCNC: 34.6 G/DL (ref 31.4–37.4)
MCV RBC AUTO: 90 FL (ref 82–98)
METHADONE UR QL: NEGATIVE
MONOCYTES # BLD AUTO: 1.06 THOUSAND/ΜL (ref 0.17–1.22)
MONOCYTES NFR BLD AUTO: 8 % (ref 4–12)
NEUTROPHILS # BLD AUTO: 9.27 THOUSANDS/ΜL (ref 1.85–7.62)
NEUTS SEG NFR BLD AUTO: 68 % (ref 43–75)
NITRITE UR QL STRIP: NEGATIVE
OPIATES UR QL SCN: NEGATIVE
OXYCODONE+OXYMORPHONE UR QL SCN: NEGATIVE
PCP UR QL: NEGATIVE
PH UR STRIP.AUTO: 6 [PH]
PLATELET # BLD AUTO: 325 THOUSANDS/UL (ref 149–390)
PMV BLD AUTO: 10.2 FL (ref 8.9–12.7)
POTASSIUM SERPL-SCNC: 3.3 MMOL/L (ref 3.5–5.3)
PROT SERPL-MCNC: 8.7 G/DL (ref 6.4–8.2)
PROT UR STRIP-MCNC: NEGATIVE MG/DL
RBC # BLD AUTO: 4.8 MILLION/UL (ref 3.88–5.62)
SODIUM SERPL-SCNC: 139 MMOL/L (ref 136–145)
SP GR UR STRIP.AUTO: 1.02 (ref 1–1.03)
THC UR QL: POSITIVE
UROBILINOGEN UR QL STRIP.AUTO: 0.2 E.U./DL
WBC # BLD AUTO: 13.67 THOUSAND/UL (ref 4.31–10.16)

## 2021-11-20 PROCEDURE — 96374 THER/PROPH/DIAG INJ IV PUSH: CPT

## 2021-11-20 PROCEDURE — 99284 EMERGENCY DEPT VISIT MOD MDM: CPT

## 2021-11-20 PROCEDURE — 85025 COMPLETE CBC W/AUTO DIFF WBC: CPT | Performed by: EMERGENCY MEDICINE

## 2021-11-20 PROCEDURE — 36415 COLL VENOUS BLD VENIPUNCTURE: CPT | Performed by: EMERGENCY MEDICINE

## 2021-11-20 PROCEDURE — 80307 DRUG TEST PRSMV CHEM ANLYZR: CPT | Performed by: EMERGENCY MEDICINE

## 2021-11-20 PROCEDURE — 80053 COMPREHEN METABOLIC PANEL: CPT | Performed by: EMERGENCY MEDICINE

## 2021-11-20 PROCEDURE — 83735 ASSAY OF MAGNESIUM: CPT | Performed by: EMERGENCY MEDICINE

## 2021-11-20 PROCEDURE — 99284 EMERGENCY DEPT VISIT MOD MDM: CPT | Performed by: EMERGENCY MEDICINE

## 2021-11-20 PROCEDURE — 83690 ASSAY OF LIPASE: CPT | Performed by: EMERGENCY MEDICINE

## 2021-11-20 PROCEDURE — 96361 HYDRATE IV INFUSION ADD-ON: CPT

## 2021-11-20 PROCEDURE — 81003 URINALYSIS AUTO W/O SCOPE: CPT | Performed by: EMERGENCY MEDICINE

## 2021-11-20 PROCEDURE — 96375 TX/PRO/DX INJ NEW DRUG ADDON: CPT

## 2021-11-20 RX ORDER — ONDANSETRON 2 MG/ML
4 INJECTION INTRAMUSCULAR; INTRAVENOUS ONCE
Status: COMPLETED | OUTPATIENT
Start: 2021-11-20 | End: 2021-11-20

## 2021-11-20 RX ORDER — BUPRENORPHINE HYDROCHLORIDE 8 MG/1
8 TABLET SUBLINGUAL DAILY
COMMUNITY

## 2021-11-20 RX ORDER — ROPINIROLE 0.25 MG/1
0.5 TABLET, FILM COATED ORAL 3 TIMES DAILY
Status: DISCONTINUED | OUTPATIENT
Start: 2021-11-20 | End: 2021-11-20

## 2021-11-20 RX ORDER — POTASSIUM CHLORIDE 20 MEQ/1
40 TABLET, EXTENDED RELEASE ORAL ONCE
Status: COMPLETED | OUTPATIENT
Start: 2021-11-20 | End: 2021-11-20

## 2021-11-20 RX ORDER — ONDANSETRON 4 MG/1
4 TABLET, ORALLY DISINTEGRATING ORAL EVERY 6 HOURS PRN
Qty: 12 TABLET | Refills: 0 | Status: SHIPPED | OUTPATIENT
Start: 2021-11-20

## 2021-11-20 RX ORDER — ROPINIROLE 0.25 MG/1
0.5 TABLET, FILM COATED ORAL ONCE
Status: COMPLETED | OUTPATIENT
Start: 2021-11-20 | End: 2021-11-20

## 2021-11-20 RX ORDER — ROPINIROLE 0.5 MG/1
0.5 TABLET, FILM COATED ORAL
Qty: 15 TABLET | Refills: 0 | Status: SHIPPED | OUTPATIENT
Start: 2021-11-20

## 2021-11-20 RX ADMIN — ONDANSETRON 4 MG: 2 INJECTION INTRAMUSCULAR; INTRAVENOUS at 08:51

## 2021-11-20 RX ADMIN — SODIUM CHLORIDE 1000 ML: 0.9 INJECTION, SOLUTION INTRAVENOUS at 08:34

## 2021-11-20 RX ADMIN — FAMOTIDINE 40 MG: 10 INJECTION, SOLUTION INTRAVENOUS at 08:56

## 2021-11-20 RX ADMIN — ROPINIROLE 0.5 MG: 0.25 TABLET, FILM COATED ORAL at 12:47

## 2021-11-20 RX ADMIN — POTASSIUM CHLORIDE 40 MEQ: 1500 TABLET, EXTENDED RELEASE ORAL at 10:23

## 2023-05-07 ENCOUNTER — APPOINTMENT (EMERGENCY)
Dept: RADIOLOGY | Facility: HOSPITAL | Age: 33
End: 2023-05-07

## 2023-05-07 ENCOUNTER — APPOINTMENT (EMERGENCY)
Dept: CT IMAGING | Facility: HOSPITAL | Age: 33
End: 2023-05-07

## 2023-05-07 ENCOUNTER — HOSPITAL ENCOUNTER (EMERGENCY)
Facility: HOSPITAL | Age: 33
Discharge: HOME | End: 2023-05-08
Attending: EMERGENCY MEDICINE | Admitting: INTERNAL MEDICINE

## 2023-05-07 DIAGNOSIS — I21.4 NSTEMI (NON-ST ELEVATED MYOCARDIAL INFARCTION) (HCC): ICD-10-CM

## 2023-05-07 DIAGNOSIS — R56.9 SEIZURE (HCC): Primary | ICD-10-CM

## 2023-05-07 DIAGNOSIS — F19.10 POLYSUBSTANCE ABUSE (HCC): ICD-10-CM

## 2023-05-07 LAB
ALBUMIN SERPL BCP-MCNC: 4.3 G/DL (ref 3.5–5)
ALP SERPL-CCNC: 75 U/L (ref 34–104)
ALT SERPL W P-5'-P-CCNC: 51 U/L (ref 7–52)
AMPHETAMINES SERPL QL SCN: POSITIVE
ANION GAP SERPL CALCULATED.3IONS-SCNC: 8 MMOL/L (ref 4–13)
APAP SERPL-MCNC: <10 UG/ML (ref 10–20)
AST SERPL W P-5'-P-CCNC: 52 U/L (ref 13–39)
BARBITURATES UR QL: NEGATIVE
BASOPHILS # BLD AUTO: 0.06 THOUSANDS/ÂΜL (ref 0–0.1)
BASOPHILS NFR BLD AUTO: 1 % (ref 0–1)
BENZODIAZ UR QL: POSITIVE
BILIRUB SERPL-MCNC: 0.37 MG/DL (ref 0.2–1)
BUN SERPL-MCNC: 20 MG/DL (ref 5–25)
CALCIUM SERPL-MCNC: 9.5 MG/DL (ref 8.4–10.2)
CARDIAC TROPONIN I PNL SERPL HS: 130 NG/L
CHLORIDE SERPL-SCNC: 100 MMOL/L (ref 96–108)
CO2 SERPL-SCNC: 30 MMOL/L (ref 21–32)
COCAINE UR QL: POSITIVE
CREAT SERPL-MCNC: 1.2 MG/DL (ref 0.6–1.3)
EOSINOPHIL # BLD AUTO: 0.36 THOUSAND/ÂΜL (ref 0–0.61)
EOSINOPHIL NFR BLD AUTO: 4 % (ref 0–6)
ERYTHROCYTE [DISTWIDTH] IN BLOOD BY AUTOMATED COUNT: 12.7 % (ref 11.6–15.1)
ETHANOL SERPL-MCNC: <10 MG/DL
GFR SERPL CREATININE-BSD FRML MDRD: 80 ML/MIN/1.73SQ M
GLUCOSE SERPL-MCNC: 86 MG/DL (ref 65–140)
GLUCOSE SERPL-MCNC: 89 MG/DL (ref 65–140)
HCT VFR BLD AUTO: 45.7 % (ref 36.5–49.3)
HGB BLD-MCNC: 15 G/DL (ref 12–17)
IMM GRANULOCYTES # BLD AUTO: 0.03 THOUSAND/UL (ref 0–0.2)
IMM GRANULOCYTES NFR BLD AUTO: 0 % (ref 0–2)
LYMPHOCYTES # BLD AUTO: 2.36 THOUSANDS/ÂΜL (ref 0.6–4.47)
LYMPHOCYTES NFR BLD AUTO: 23 % (ref 14–44)
MCH RBC QN AUTO: 29.7 PG (ref 26.8–34.3)
MCHC RBC AUTO-ENTMCNC: 32.8 G/DL (ref 31.4–37.4)
MCV RBC AUTO: 91 FL (ref 82–98)
METHADONE UR QL: NEGATIVE
MONOCYTES # BLD AUTO: 0.69 THOUSAND/ÂΜL (ref 0.17–1.22)
MONOCYTES NFR BLD AUTO: 7 % (ref 4–12)
NEUTROPHILS # BLD AUTO: 6.57 THOUSANDS/ÂΜL (ref 1.85–7.62)
NEUTS SEG NFR BLD AUTO: 65 % (ref 43–75)
NRBC BLD AUTO-RTO: 0 /100 WBCS
OPIATES UR QL SCN: NEGATIVE
OXYCODONE+OXYMORPHONE UR QL SCN: NEGATIVE
PCP UR QL: POSITIVE
PLATELET # BLD AUTO: 192 THOUSANDS/UL (ref 149–390)
PMV BLD AUTO: 9.8 FL (ref 8.9–12.7)
POTASSIUM SERPL-SCNC: 3.8 MMOL/L (ref 3.5–5.3)
PROT SERPL-MCNC: 7.4 G/DL (ref 6.4–8.4)
RBC # BLD AUTO: 5.05 MILLION/UL (ref 3.88–5.62)
SALICYLATES SERPL-MCNC: <5 MG/DL (ref 3–20)
SODIUM SERPL-SCNC: 138 MMOL/L (ref 135–147)
THC UR QL: POSITIVE
WBC # BLD AUTO: 10.07 THOUSAND/UL (ref 4.31–10.16)

## 2023-05-07 RX ORDER — ONDANSETRON 2 MG/ML
4 INJECTION INTRAMUSCULAR; INTRAVENOUS ONCE
Status: COMPLETED | OUTPATIENT
Start: 2023-05-07 | End: 2023-05-07

## 2023-05-07 RX ADMIN — SODIUM CHLORIDE 1000 ML: 0.9 INJECTION, SOLUTION INTRAVENOUS at 22:32

## 2023-05-07 RX ADMIN — ONDANSETRON 4 MG: 2 INJECTION INTRAMUSCULAR; INTRAVENOUS at 22:33

## 2023-05-07 NOTE — Clinical Note
Case was discussed with Gabby Hernández and the patient's admission status was agreed to be Admission Status: inpatient status to the service of Dr Cori Oakley

## 2023-05-08 VITALS
SYSTOLIC BLOOD PRESSURE: 113 MMHG | DIASTOLIC BLOOD PRESSURE: 54 MMHG | RESPIRATION RATE: 16 BRPM | BODY MASS INDEX: 28.17 KG/M2 | HEART RATE: 76 BPM | WEIGHT: 207.67 LBS | OXYGEN SATURATION: 95 % | TEMPERATURE: 98.3 F

## 2023-05-08 LAB
2HR DELTA HS TROPONIN: 42 NG/L
ATRIAL RATE: 101 BPM
CARDIAC TROPONIN I PNL SERPL HS: 172 NG/L
P AXIS: 66 DEGREES
PR INTERVAL: 158 MS
QRS AXIS: 78 DEGREES
QRSD INTERVAL: 86 MS
QT INTERVAL: 342 MS
QTC INTERVAL: 443 MS
T WAVE AXIS: 50 DEGREES
VENTRICULAR RATE: 101 BPM

## 2023-05-08 RX ADMIN — LEVETIRACETAM 1500 MG: 100 INJECTION, SOLUTION INTRAVENOUS at 00:07

## 2023-05-09 ENCOUNTER — HOSPITAL ENCOUNTER (INPATIENT)
Facility: HOSPITAL | Age: 33
LOS: 1 days | Discharge: HOME | End: 2023-05-10
Attending: EMERGENCY MEDICINE | Admitting: INTERNAL MEDICINE

## 2023-05-09 ENCOUNTER — APPOINTMENT (INPATIENT)
Dept: MRI IMAGING | Facility: HOSPITAL | Age: 33
End: 2023-05-09

## 2023-05-09 ENCOUNTER — APPOINTMENT (EMERGENCY)
Dept: RADIOLOGY | Facility: HOSPITAL | Age: 33
End: 2023-05-09

## 2023-05-09 ENCOUNTER — APPOINTMENT (INPATIENT)
Dept: RADIOLOGY | Facility: HOSPITAL | Age: 33
End: 2023-05-09

## 2023-05-09 DIAGNOSIS — H55.09: ICD-10-CM

## 2023-05-09 DIAGNOSIS — R56.9 SEIZURE-LIKE ACTIVITY (HCC): ICD-10-CM

## 2023-05-09 DIAGNOSIS — R53.1 GENERALIZED WEAKNESS: Primary | ICD-10-CM

## 2023-05-09 DIAGNOSIS — F19.10 POLYSUBSTANCE ABUSE (HCC): ICD-10-CM

## 2023-05-09 PROBLEM — R00.1 BRADYCARDIA: Status: ACTIVE | Noted: 2023-05-09

## 2023-05-09 LAB
2HR DELTA HS TROPONIN: -3 NG/L
4HR DELTA HS TROPONIN: -9 NG/L
ALBUMIN SERPL BCP-MCNC: 4.4 G/DL (ref 3.5–5)
ALP SERPL-CCNC: 80 U/L (ref 34–104)
ALT SERPL W P-5'-P-CCNC: 45 U/L (ref 7–52)
AMPHETAMINES SERPL QL SCN: NEGATIVE
ANION GAP SERPL CALCULATED.3IONS-SCNC: 6 MMOL/L (ref 4–13)
APAP SERPL-MCNC: <10 UG/ML (ref 10–20)
AST SERPL W P-5'-P-CCNC: 38 U/L (ref 13–39)
BARBITURATES UR QL: NEGATIVE
BASOPHILS # BLD AUTO: 0.04 THOUSANDS/ÂΜL (ref 0–0.1)
BASOPHILS NFR BLD AUTO: 0 % (ref 0–1)
BENZODIAZ UR QL: POSITIVE
BILIRUB SERPL-MCNC: 0.67 MG/DL (ref 0.2–1)
BILIRUB UR QL STRIP: NEGATIVE
BUN SERPL-MCNC: 12 MG/DL (ref 5–25)
CALCIUM SERPL-MCNC: 9.9 MG/DL (ref 8.4–10.2)
CARDIAC TROPONIN I PNL SERPL HS: 24 NG/L
CARDIAC TROPONIN I PNL SERPL HS: 30 NG/L
CARDIAC TROPONIN I PNL SERPL HS: 33 NG/L
CHLORIDE SERPL-SCNC: 104 MMOL/L (ref 96–108)
CK SERPL-CCNC: 781 U/L (ref 39–308)
CLARITY UR: CLEAR
CO2 SERPL-SCNC: 28 MMOL/L (ref 21–32)
COCAINE UR QL: NEGATIVE
COLOR UR: NORMAL
CREAT SERPL-MCNC: 0.75 MG/DL (ref 0.6–1.3)
EOSINOPHIL # BLD AUTO: 0.09 THOUSAND/ÂΜL (ref 0–0.61)
EOSINOPHIL NFR BLD AUTO: 1 % (ref 0–6)
ERYTHROCYTE [DISTWIDTH] IN BLOOD BY AUTOMATED COUNT: 12.5 % (ref 11.6–15.1)
ETHANOL SERPL-MCNC: <10 MG/DL
GFR SERPL CREATININE-BSD FRML MDRD: 122 ML/MIN/1.73SQ M
GLUCOSE SERPL-MCNC: 101 MG/DL (ref 65–140)
GLUCOSE SERPL-MCNC: 107 MG/DL (ref 65–140)
GLUCOSE SERPL-MCNC: 88 MG/DL (ref 65–140)
GLUCOSE UR STRIP-MCNC: NEGATIVE MG/DL
HCT VFR BLD AUTO: 49.1 % (ref 36.5–49.3)
HGB BLD-MCNC: 16.2 G/DL (ref 12–17)
HGB UR QL STRIP.AUTO: NEGATIVE
IMM GRANULOCYTES # BLD AUTO: 0.04 THOUSAND/UL (ref 0–0.2)
IMM GRANULOCYTES NFR BLD AUTO: 0 % (ref 0–2)
KETONES UR STRIP-MCNC: NEGATIVE MG/DL
LACTATE SERPL-SCNC: 1.1 MMOL/L (ref 0.5–2)
LEUKOCYTE ESTERASE UR QL STRIP: NEGATIVE
LYMPHOCYTES # BLD AUTO: 1.76 THOUSANDS/ÂΜL (ref 0.6–4.47)
LYMPHOCYTES NFR BLD AUTO: 17 % (ref 14–44)
MAGNESIUM SERPL-MCNC: 1.9 MG/DL (ref 1.9–2.7)
MCH RBC QN AUTO: 29.7 PG (ref 26.8–34.3)
MCHC RBC AUTO-ENTMCNC: 33 G/DL (ref 31.4–37.4)
MCV RBC AUTO: 90 FL (ref 82–98)
METHADONE UR QL: NEGATIVE
MONOCYTES # BLD AUTO: 0.62 THOUSAND/ÂΜL (ref 0.17–1.22)
MONOCYTES NFR BLD AUTO: 6 % (ref 4–12)
NEUTROPHILS # BLD AUTO: 8.13 THOUSANDS/ÂΜL (ref 1.85–7.62)
NEUTS SEG NFR BLD AUTO: 76 % (ref 43–75)
NITRITE UR QL STRIP: NEGATIVE
NRBC BLD AUTO-RTO: 0 /100 WBCS
OPIATES UR QL SCN: NEGATIVE
OXYCODONE+OXYMORPHONE UR QL SCN: NEGATIVE
PCP UR QL: POSITIVE
PH UR STRIP.AUTO: 7 [PH]
PLATELET # BLD AUTO: 192 THOUSANDS/UL (ref 149–390)
PLATELET # BLD AUTO: 193 THOUSANDS/UL (ref 149–390)
PMV BLD AUTO: 9.5 FL (ref 8.9–12.7)
PMV BLD AUTO: 9.7 FL (ref 8.9–12.7)
POTASSIUM SERPL-SCNC: 4.5 MMOL/L (ref 3.5–5.3)
PROT SERPL-MCNC: 7.5 G/DL (ref 6.4–8.4)
PROT UR STRIP-MCNC: NEGATIVE MG/DL
RBC # BLD AUTO: 5.45 MILLION/UL (ref 3.88–5.62)
SALICYLATES SERPL-MCNC: <5 MG/DL (ref 3–20)
SODIUM SERPL-SCNC: 138 MMOL/L (ref 135–147)
SP GR UR STRIP.AUTO: 1.01 (ref 1–1.03)
THC UR QL: NEGATIVE
UROBILINOGEN UR STRIP-ACNC: <2 MG/DL
WBC # BLD AUTO: 10.68 THOUSAND/UL (ref 4.31–10.16)

## 2023-05-09 RX ORDER — BUPRENORPHINE HYDROCHLORIDE 8 MG/1
8 TABLET SUBLINGUAL 2 TIMES DAILY
Status: DISCONTINUED | OUTPATIENT
Start: 2023-05-09 | End: 2023-05-09

## 2023-05-09 RX ORDER — BUPRENORPHINE AND NALOXONE 8; 2 MG/1; MG/1
8 FILM, SOLUBLE BUCCAL; SUBLINGUAL 2 TIMES DAILY
Status: DISCONTINUED | OUTPATIENT
Start: 2023-05-09 | End: 2023-05-10 | Stop reason: HOSPADM

## 2023-05-09 RX ORDER — ENOXAPARIN SODIUM 100 MG/ML
40 INJECTION SUBCUTANEOUS DAILY
Status: DISCONTINUED | OUTPATIENT
Start: 2023-05-09 | End: 2023-05-10 | Stop reason: HOSPADM

## 2023-05-09 RX ORDER — CLONAZEPAM 1 MG/1
1 TABLET ORAL 2 TIMES DAILY
Status: DISCONTINUED | OUTPATIENT
Start: 2023-05-09 | End: 2023-05-10 | Stop reason: HOSPADM

## 2023-05-09 RX ORDER — ACETAMINOPHEN 325 MG/1
650 TABLET ORAL EVERY 6 HOURS PRN
Status: DISCONTINUED | OUTPATIENT
Start: 2023-05-09 | End: 2023-05-10 | Stop reason: HOSPADM

## 2023-05-09 RX ADMIN — BUPRENORPHINE AND NALOXONE 8 MG: 8; 2 FILM BUCCAL; SUBLINGUAL at 12:58

## 2023-05-09 RX ADMIN — BUPRENORPHINE AND NALOXONE 8 MG: 8; 2 FILM BUCCAL; SUBLINGUAL at 21:09

## 2023-05-09 RX ADMIN — SODIUM CHLORIDE, SODIUM LACTATE, POTASSIUM CHLORIDE, AND CALCIUM CHLORIDE 1000 ML: .6; .31; .03; .02 INJECTION, SOLUTION INTRAVENOUS at 06:41

## 2023-05-09 RX ADMIN — GADOBUTROL 9 ML: 604.72 INJECTION INTRAVENOUS at 18:00

## 2023-05-09 RX ADMIN — ENOXAPARIN SODIUM 40 MG: 40 INJECTION SUBCUTANEOUS at 12:09

## 2023-05-09 RX ADMIN — CLONAZEPAM 1 MG: 1 TABLET ORAL at 12:09

## 2023-05-09 RX ADMIN — CLONAZEPAM 1 MG: 1 TABLET ORAL at 19:06

## 2023-05-09 NOTE — ASSESSMENT & PLAN NOTE
- Per chart review, wife reported patient had been battling drug addiction the past 10 years  - 5/7/2023 Rapid drug urine positive for: Amph/Meth, benzodiazepine, cocaine, PCP, THC  - 5/9/2023 Rapid drug urine positive for: Benzodiazepine and PCP  - Patient is currently on Suboxone 1 mg BID and Klonopin 1 mg BID  - Management per primary team

## 2023-05-09 NOTE — PLAN OF CARE
Problem: Potential for Falls  Goal: Patient will remain free of falls  Description: INTERVENTIONS:  - Educate patient/family on patient safety including physical limitations  - Instruct patient to call for assistance with activity   - Consult OT/PT to assist with strengthening/mobility   - Keep Call bell within reach  - Keep bed low and locked with side rails adjusted as appropriate  - Keep care items and personal belongings within reach  - Initiate and maintain comfort rounds  5/9/2023 0947 by Chelsea Lopez RN  Outcome: Progressing  5/9/2023 0947 by Chelsea Lopez RN  Outcome: Progressing     Problem: PAIN - ADULT  Goal: Verbalizes/displays adequate comfort level or baseline comfort level  Description: Interventions:  - Encourage patient to monitor pain and request assistance  - Assess pain using appropriate pain scale  - Administer analgesics based on type and severity of pain and evaluate response  - Implement non-pharmacological measures as appropriate and evaluate response  - Consider cultural and social influences on pain and pain management  - Notify physician/advanced practitioner if interventions unsuccessful or patient reports new pain  5/9/2023 0947 by Chelsea Lopez RN  Outcome: Progressing  5/9/2023 0947 by Chelsea Lopez RN  Outcome: Progressing     Problem: INFECTION - ADULT  Goal: Absence or prevention of progression during hospitalization  Description: INTERVENTIONS:  - Assess and monitor for signs and symptoms of infection  - Monitor lab/diagnostic results  - Monitor all insertion sites, i e  indwelling lines, tubes, and drains  - Monitor endotracheal if appropriate and nasal secretions for changes in amount and color  - [place] appropriate cooling/warming therapies per order  - Administer medications as ordered  - Instruct and encourage patient and family to use good hand hygiene technique  - Identify and instruct in appropriate isolation precautions for identified infection/condition  5/9/2023 0947 by Justo Pierre RN  Outcome: Progressing  5/9/2023 0947 by Justo Pierre RN  Outcome: Progressing     Problem: DISCHARGE PLANNING  Goal: Discharge to home or other facility with appropriate resources  Description: INTERVENTIONS:  - Identify barriers to discharge w/patient and caregiver  - Arrange for needed discharge resources and transportation as appropriate  - Identify discharge learning needs (meds, wound care, etc )  - Arrange for interpretive services to assist at discharge as needed  - Refer to Case Management Department for coordinating discharge planning if the patient needs post-hospital services based on physician/advanced practitioner order or complex needs related to functional status, cognitive ability, or social support system  5/9/2023 0947 by Justo Pierre RN  Outcome: Progressing  5/9/2023 0947 by Justo Pierre RN  Outcome: Progressing     Problem: Knowledge Deficit  Goal: Patient/family/caregiver demonstrates understanding of disease process, treatment plan, medications, and discharge instructions  Description: Complete learning assessment and assess knowledge base    Interventions:  - Provide teaching at level of understanding  - Provide teaching via preferred learning methods  5/9/2023 0947 by Justo Pierre RN  Outcome: Progressing  5/9/2023 0947 by Justo Pierre RN  Outcome: Progressing

## 2023-05-09 NOTE — ASSESSMENT & PLAN NOTE
· On admission, reports 2-3 incidents of seizure-like activity since Saturday  Described as grand mal   Witnessed by his wife  · Reports postictal state of confusion lethargic  · Initially patient denied any drug use however on further questioning he did endorse using PCP and fentanyl a few days prior to seizure onset  · Has an extensive history of polysubstance abuse  He is currently on Subutex and Klonopin  · Denies any prior history of seizure activity  · Wife reports that he has been battling with drug addiction for the past 10 years but has never had a seizure until recently  · Left AMA 2 days ago, thus never received prior work up    However on that admission patient was noted to be disoriented tachycardic and  clammy with troponin elevation concerning for NSTEMI  · Since leaving AMA, he has been experiencing lethargy, confusion, gait instability and slurred speech  ·      Plan:  MRI of the brain ordered, follow-up results  Random EEG  Follow-up neurologist recommendation  Neurochecks every shift

## 2023-05-09 NOTE — H&P
[facility]  H&P  Name: Maurice Rodriges 34 y o  male I MRN: [id number]  Unit/Bed#: ED-18 I Date of Admission: 5/9/2023   Date of Service: 5/9/2023 I Hospital Day: 0      Assessment/Plan   * Seizure-like activity [facility])  Assessment & Plan  · On admission, reports 2-3 incidents of seizure-like activity since Saturday  Described as grand mal   Witnessed by his wife  · Reports postictal state of confusion lethargic  · Initially patient denied any drug use however on further questioning he did endorse using PCP and fentanyl a few days prior to seizure onset  · Has an extensive history of polysubstance abuse  He is currently on Subutex and Klonopin  · Denies any prior history of seizure activity  · Wife reports that he has been battling with drug addiction for the past 10 years but has never had a seizure until recently  · Left AMA 2 days ago, thus never received prior work up  However on that admission patient was noted to be disoriented tachycardic and  clammy with troponin elevation concerning for NSTEMI  · Since leaving AMA, he has been experiencing lethargy, confusion, gait instability and slurred speech  ·      Plan:  MRI of the brain ordered, follow-up results  Random EEG  Follow-up neurologist recommendation  Neurochecks every shift    Polysubstance abuse ([facility] [address] )  Assessment & Plan  · Urine drug screen on admission positive for benzodiazepine and PCP    Urine drug test on 05/07 was positive for cocaine, PCP, methamphetamine, THC  · On admission he denied any drug use however on further questioning he did endorse using PCP and fentanyl a few days prior to the seizure activity  · He is currently on Suboxone 1 mg twice daily and Klonopin 1 mg twice daily  · On PDMP both medications were last filled on 04/4/23  90 tablets of Klonopin and 60 tablets of Subutex      Bradycardia  Assessment & Plan  On admission HR 61>57  EKG showing sinus Bradycardia  Asymptomatic at this time     Attention deficit disorder  Assessment & Plan  Reports that he is no longer taking Adderall       VTE Pharmacologic Prophylaxis: VTE Score: 3 Moderate Risk (Score 3-4) - Pharmacological DVT Prophylaxis Ordered: enoxaparin (Lovenox)  Code Status: Level 1 - Full Code discussed with patient at bedside   Discussion with family: Updated  (wife) via phone  Anticipated Length of Stay: Patient will be admitted on an observation basis with an anticipated length of stay of less than 2 midnights secondary to seizure like activity   Chief Complaint:     History of Present Illness:  Hubert Lee is a 34 y o  male with a PMH of Polysubstance abuse, anxiety and ADHD who presents with lethargy, fatigue and genealized weakness  Patient reports that a few days ago he smoked PCP and Fentanyl and then had two episodes of seizure like activities, on Saturday and Sunday  He described it and generalized shaking, that initially started in his lower extremities then progressed to his entire body  Denied any urinary incontinence or tongue biting  denied any prior history of seizure  He states that his wife who witness the incident called the ambulance  While in the ED two days ago,  he was noted to have elevated troponin concerning for an NSTEMI, unfortunately he left AMA  When asked why, he states that he just didn't want to stay in the hospital  He states that after the seizure like activity he was increasingly lethargic and confused  He states the since being home he has been sleeping more, feeling tired/lethargic  He states that due to the pleading of his wife, he decided to return to the ED for further work up  Review of Systems:  Review of Systems   Constitutional: Positive for fatigue  Negative for chills and fever  HENT: Negative for ear pain and sore throat  Eyes: Negative for pain and visual disturbance  Respiratory: Negative for shortness of breath      Cardiovascular: Negative for chest pain and palpitations  Gastrointestinal: Negative for abdominal pain and vomiting  Genitourinary: Negative for dysuria and hematuria  Musculoskeletal: Negative for arthralgias and back pain  Skin: Negative for color change and rash  Neurological: Positive for speech difficulty and weakness  Negative for seizures and syncope  Psychiatric/Behavioral: Positive for confusion and decreased concentration  All other systems reviewed and are negative  Past Medical and Surgical History:   Past Medical History:   Diagnosis Date   • ADHD (attention deficit hyperactivity disorder)    • Anxiety    • Asthma        History reviewed  No pertinent surgical history  Meds/Allergies:  Prior to Admission medications    Medication Sig Start Date End Date Taking? Authorizing Provider   buprenorphine (SUBUTEX) 8 mg Place 8 mg under the tongue 2 (two) times a day   Yes Historical Provider, MD   clonazePAM (KlonoPIN) 1 mg tablet Take 1 mg by mouth 2 (two) times a day   Yes Historical Provider, MD   ondansetron (ZOFRAN-ODT) 4 mg disintegrating tablet Take 1 tablet (4 mg total) by mouth every 6 (six) hours as needed for nausea or vomiting 11/20/21   Corrine Betts DO   albuterol (PROVENTIL HFA,VENTOLIN HFA) 90 mcg/act inhaler Inhale 2 puffs every 6 (six) hours as needed for wheezing  Patient not taking: Reported on 11/20/2021 5/9/23  Historical Provider, MD   amphetamine-dextroamphetamine (ADDERALL) 30 MG tablet Take 30 mg by mouth 2 (two) times a day  5/9/23  Historical Provider, MD   rOPINIRole (REQUIP) 0 5 mg tablet Take 1 tablet (0 5 mg total) by mouth daily at bedtime 11/20/21 5/9/23  Marimar Betts DO     I have reviewed home medications with patient personally      Allergies: No Known Allergies    Social History:  Marital Status: /Civil Union   Occupation: Lost his job  Patient Pre-hospital Living Situation: Home  Patient Pre-hospital Level of Mobility: walks  Patient Pre-hospital Diet Restrictions: none  Substance Use History:   Social History     Substance and Sexual Activity   Alcohol Use Yes    Comment: weekends     Social History     Tobacco Use   Smoking Status Every Day   • Packs/day: 0 50   • Years: 7 00   • Pack years: 3 50   • Types: Cigarettes   Smokeless Tobacco Never     Social History     Substance and Sexual Activity   Drug Use Yes   • Types: Marijuana    Comment: OCCASIONAL  Family History:  Family History   Problem Relation Age of Onset   • Heart disease Father    • Hypertension Father    • Hodgkin's lymphoma Father    • Hypertension Paternal Grandfather    • No Known Problems Mother        Physical Exam:     Vitals:   Blood Pressure: 122/60 (05/09/23 1424)  Pulse: 75 (05/09/23 1424)  Temperature: 97 9 °F (36 6 °C) (05/09/23 0554)  Temp Source: Oral (05/09/23 0554)  Respirations: 16 (05/09/23 1424)  Height: 6' (182 9 cm) (05/09/23 0945)  Weight - Scale: 93 9 kg (207 lb) (05/09/23 0945)  SpO2: 98 % (05/09/23 1424)    Physical Exam  Constitutional:       Appearance: He is ill-appearing  Eyes:      Extraocular Movements:      Right eye: Nystagmus present  Left eye: Nystagmus present  Comments: Pupils are dilated    Cardiovascular:      Rate and Rhythm: Regular rhythm  Bradycardia present  Pulmonary:      Effort: Pulmonary effort is normal       Breath sounds: Normal breath sounds  Abdominal:      General: Bowel sounds are normal       Palpations: Abdomen is soft  Musculoskeletal:         General: No swelling or tenderness  Right lower leg: No edema  Left lower leg: No edema  Skin:     General: Skin is warm  Findings: Rash present  Neurological:      Mental Status: He is oriented to person, place, and time and easily aroused  He is lethargic  Cranial Nerves: Dysarthria present  Sensory: Sensation is intact  Motor: Motor function is intact        Gait: Gait abnormal         Additional Data:     Lab Results:  Results from last 7 days   Lab Units 05/09/23  0642   WBC Thousand/uL 10 68*   HEMOGLOBIN g/dL 16 2   HEMATOCRIT % 49 1   PLATELETS Thousands/uL 193   NEUTROS PCT % 76*   LYMPHS PCT % 17   MONOS PCT % 6   EOS PCT % 1     Results from last 7 days   Lab Units 05/09/23  0642   SODIUM mmol/L 138   POTASSIUM mmol/L 4 5   CHLORIDE mmol/L 104   CO2 mmol/L 28   BUN mg/dL 12   CREATININE mg/dL 0 75   ANION GAP mmol/L 6   CALCIUM mg/dL 9 9   ALBUMIN g/dL 4 4   TOTAL BILIRUBIN mg/dL 0 67   ALK PHOS U/L 80   ALT U/L 45   AST U/L 38   GLUCOSE RANDOM mg/dL 101         Results from last 7 days   Lab Units 05/09/23  1215 05/09/23  0702 05/07/23  2110   POC GLUCOSE mg/dl 107 88 86         Results from last 7 days   Lab Units 05/09/23  0642   LACTIC ACID mmol/L 1 1       Lines/Drains:  Invasive Devices     Peripheral Intravenous Line  Duration           Peripheral IV 05/09/23 Right Antecubital <1 day                    Imaging: Reviewed radiology reports from this admission including: chest xray  XR chest 2 views   Final Result by Aristides Abreu DO (05/09 8472)      No acute cardiopulmonary disease is seen  Workstation performed: [id number]         MRI inpatient order    (Results Pending)   XR follow up    (Results Pending)       EKG and Other Studies Reviewed on Admission:   · EKG: Sinus Bradycardia  HR 51     ** Please Note: This note has been constructed using a voice recognition system   **

## 2023-05-09 NOTE — ED PROVIDER NOTES
History  Chief Complaint   Patient presents with   • Medical Problem     Pt presents to ED with wife, had an NSTEMI on Sunday and signed out AMA but pt's wife forced pt to come back for treatment  Pt denies cp, sob, and denies any other complaint     Patient is a 59-year-old male with PMH of IVDA, anxiety, asthma, and ADHD presenting for evaluation of 2 days of generalized weakness  Review of the patient's chart notes that he was seen 2 days ago in the ED for seizure-like activity  He notes prior to that visit taking a tab of fentanyl and subsequently having seizure-like activity for which his wife brought him in  While in the ED, he was found to have elevated troponins concerning for NSTEMI and/or other acute cardiac pathology  He was told that he would be admitted and he chose to leave AMA  Since leaving 2 days ago, his wife notes that he is mostly been staying on the couch  He has been very less active and not acting himself  Earlier this morning, he notes he went to  his daughter and he staggered almost falling to the ground dropping her  His wife urged him to come back for further evaluation of his symptoms  He was agreeable given feeling fatigued and generalized weakness  He currently denies all pain  He denies headaches, vision changes including blurred vision/double vision/floaters, tinnitus, sore throat, cough, CP/SOB, palpitations, heart fluttering, abdominal pain, N/V/D, urinary complaints, and skin changes  He denies any recreational drug use since prior to his last ER visit  He notes in the past he is using IVDA for which he has been taking Suboxone  He expresses sadness over recent relapse  History provided by:  Patient   used: No        Prior to Admission Medications   Prescriptions Last Dose Informant Patient Reported? Taking?    albuterol (PROVENTIL HFA,VENTOLIN HFA) 90 mcg/act inhaler   Yes No   Sig: Inhale 2 puffs every 6 (six) hours as needed for wheezing   Patient not taking: Reported on 11/20/2021    amphetamine-dextroamphetamine (ADDERALL) 30 MG tablet   Yes No   Sig: Take 30 mg by mouth 2 (two) times a day   Patient not taking: Reported on 11/20/2021    buprenorphine (SUBUTEX) 8 mg   Yes No   Sig: Place 8 mg under the tongue daily   clonazePAM (KlonoPIN) 1 mg tablet   Yes No   Sig: Take 1 mg by mouth daily as needed for seizures   ondansetron (ZOFRAN-ODT) 4 mg disintegrating tablet   No No   Sig: Take 1 tablet (4 mg total) by mouth every 6 (six) hours as needed for nausea or vomiting   rOPINIRole (REQUIP) 0 5 mg tablet   No No   Sig: Take 1 tablet (0 5 mg total) by mouth daily at bedtime   Patient not taking: Reported on 5/7/2023      Facility-Administered Medications: None       Past Medical History:   Diagnosis Date   • ADHD (attention deficit hyperactivity disorder)    • Anxiety    • Asthma        History reviewed  No pertinent surgical history  Family History   Problem Relation Age of Onset   • Heart disease Father    • Hypertension Father    • Hodgkin's lymphoma Father    • Hypertension Paternal Grandfather    • No Known Problems Mother      I have reviewed and agree with the history as documented  E-Cigarette/Vaping   • E-Cigarette Use Never User      E-Cigarette/Vaping Substances   • Nicotine No    • THC No    • CBD No    • Flavoring No    • Other No    • Unknown No      Social History     Tobacco Use   • Smoking status: Every Day     Packs/day: 0 50     Years: 7 00     Pack years: 3 50     Types: Cigarettes   • Smokeless tobacco: Never   Vaping Use   • Vaping Use: Never used   Substance Use Topics   • Alcohol use: Yes     Comment: weekends   • Drug use: Yes     Types: Marijuana     Comment: OCCASIONAL  Review of Systems   Constitutional: Negative for chills and fever  HENT: Negative for congestion, sore throat, tinnitus, trouble swallowing and voice change  Eyes: Negative for photophobia, pain and visual disturbance  Gastrointestinal: Negative for abdominal pain, diarrhea, nausea and vomiting  Genitourinary: Negative  Musculoskeletal: Negative for back pain and gait problem  Skin: Negative  Allergic/Immunologic: Positive for immunocompromised state (IVDA)  Neurological: Positive for speech difficulty (Wife notes his speech is delayed/slow)  Negative for dizziness, seizures, syncope, facial asymmetry, weakness, light-headedness, numbness and headaches  All other systems reviewed and are negative  Physical Exam  Physical Exam  Vitals and nursing note reviewed  Constitutional:       General: He is not in acute distress  Appearance: Normal appearance  He is well-developed and normal weight  He is ill-appearing (Appears fatigued)  He is not toxic-appearing or diaphoretic  HENT:      Head: Normocephalic and atraumatic  Jaw: There is normal jaw occlusion  No trismus  Nose: Nose normal       Mouth/Throat:      Lips: Pink  Mouth: Mucous membranes are moist       Pharynx: Oropharynx is clear  Uvula midline  Eyes:      General: Lids are normal  Vision grossly intact  Gaze aligned appropriately  No visual field deficit  Extraocular Movements: Extraocular movements intact  Right eye: Abnormal extraocular motion and nystagmus present  Left eye: Abnormal extraocular motion and nystagmus present  Conjunctiva/sclera: Conjunctivae normal       Pupils: Pupils are equal, round, and reactive to light  Visual Fields: Right eye visual fields normal and left eye visual fields normal       Comments: 5mm b/l    Neck:      Trachea: Trachea and phonation normal  No abnormal tracheal secretions  Cardiovascular:      Rate and Rhythm: Regular rhythm  Bradycardia present  Pulses: Normal pulses  Radial pulses are 2+ on the right side and 2+ on the left side  Dorsalis pedis pulses are 2+ on the right side and 2+ on the left side        Heart sounds: Normal heart sounds, S1 normal and S2 normal  No murmur heard  Pulmonary:      Effort: Pulmonary effort is normal  No tachypnea or respiratory distress  Breath sounds: Normal breath sounds and air entry  No stridor, decreased air movement or transmitted upper airway sounds  No decreased breath sounds  Abdominal:      Palpations: Abdomen is soft  Tenderness: There is no abdominal tenderness  There is no guarding or rebound  Negative signs include Miller's sign  Musculoskeletal:         General: Normal range of motion  Cervical back: Full passive range of motion without pain, normal range of motion and neck supple  Right lower leg: No edema  Left lower leg: No edema  Comments: PETER, 4/5 strength throughout, sensation intact, no focal joint swelling  Skin:     General: Skin is warm and dry  Capillary Refill: Capillary refill takes 2 to 3 seconds  Findings: No rash or wound  Neurological:      General: No focal deficit present  Mental Status: He is alert and oriented to person, place, and time  Mental status is at baseline  GCS: GCS eye subscore is 4  GCS verbal subscore is 5  GCS motor subscore is 6  Cranial Nerves: Cranial nerves 2-12 are intact  Dysarthria present  No facial asymmetry  Sensory: Sensation is intact  Motor: Motor function is intact  No tremor, atrophy, abnormal muscle tone, seizure activity or pronator drift  Coordination: Romberg sign positive  Finger-Nose-Finger Test normal       Gait: Gait abnormal (Wide stance with shuffling gait)           Vital Signs  ED Triage Vitals   Temperature Pulse Respirations Blood Pressure SpO2   05/09/23 0554 05/09/23 0554 05/09/23 0554 05/09/23 0554 05/09/23 0554   97 9 °F (36 6 °C) 61 20 151/89 100 %      Temp Source Heart Rate Source Patient Position - Orthostatic VS BP Location FiO2 (%)   05/09/23 0554 05/09/23 0554 05/09/23 0554 05/09/23 0554 --   Oral Monitor Sitting Right arm       Pain Score       05/09/23 0730       No Pain           Vitals:    05/09/23 0554 05/09/23 0600 05/09/23 0730   BP: 151/89 162/89 156/75   Pulse: 61 57 (!) 54   Patient Position - Orthostatic VS: Sitting  Lying         Visual Acuity      ED Medications  Medications   lactated ringers bolus 1,000 mL (1,000 mL Intravenous New Bag 5/9/23 0641)       Diagnostic Studies  Results Reviewed     Procedure Component Value Units Date/Time    CK [[id number]]     Lab Status: No result Specimen: Blood     Rapid drug screen, urine [[id number]]  (Abnormal) Collected: 05/09/23 0732    Lab Status: Final result Specimen: Urine, Clean Catch Updated: 05/09/23 0808     Amph/Meth UR Negative     Barbiturate Ur Negative     Benzodiazepine Urine Positive     Cocaine Urine Negative     Methadone Urine Negative     Opiate Urine Negative     PCP Ur Positive     THC Urine Negative     Oxycodone Urine Negative    Narrative:      Presumptive report  If requested, specimen will be sent to reference lab for confirmation  FOR MEDICAL PURPOSES ONLY  IF CONFIRMATION NEEDED PLEASE CONTACT THE LAB WITHIN 5 DAYS      Drug Screen Cutoff Levels:  AMPHETAMINE/METHAMPHETAMINES  1000 ng/mL  BARBITURATES     200 ng/mL  BENZODIAZEPINES     200 ng/mL  COCAINE      300 ng/mL  METHADONE      300 ng/mL  OPIATES      300 ng/mL  PHENCYCLIDINE     25 ng/mL  THC       50 ng/mL  OXYCODONE      100 ng/mL    HS Troponin I 4hr [[id number]]     Lab Status: No result Specimen: Blood     UA w Reflex to Microscopic w Reflex to Culture [[id number]] Collected: 05/09/23 0732    Lab Status: Final result Specimen: Urine, Clean Catch Updated: 05/09/23 0746     Color, UA Light Yellow     Clarity, UA Clear     Specific Gravity, UA 1 009     pH, UA 7 0     Leukocytes, UA Negative     Nitrite, UA Negative     Protein, UA Negative mg/dl      Glucose, UA Negative mg/dl      Ketones, UA Negative mg/dl      Urobilinogen, UA <2 0 mg/dl      Bilirubin, UA Negative     Occult Blood, UA Negative    Comprehensive metabolic panel [[id number]] Collected: 05/09/23 0642    Lab Status: Final result Specimen: Blood from Arm, Right Updated: 05/09/23 0738     Sodium 138 mmol/L      Potassium 4 5 mmol/L      Chloride 104 mmol/L      CO2 28 mmol/L      ANION GAP 6 mmol/L      BUN 12 mg/dL      Creatinine 0 75 mg/dL      Glucose 101 mg/dL      Calcium 9 9 mg/dL      AST 38 U/L      ALT 45 U/L      Alkaline Phosphatase 80 U/L      Total Protein 7 5 g/dL      Albumin 4 4 g/dL      Total Bilirubin 0 67 mg/dL      eGFR 122 ml/min/1 73sq m     Narrative:      [facility] guidelines for Chronic Kidney Disease (CKD):   •  Stage 1 with normal or high GFR (GFR > 90 mL/min/1 73 square meters)  •  Stage 2 Mild CKD (GFR = 60-89 mL/min/1 73 square meters)  •  Stage 3A Moderate CKD (GFR = 45-59 mL/min/1 73 square meters)  •  Stage 3B Moderate CKD (GFR = 30-44 mL/min/1 73 square meters)  •  Stage 4 Severe CKD (GFR = 15-29 mL/min/1 73 square meters)  •  Stage 5 End Stage CKD (GFR <15 mL/min/1 73 square meters)  Note: GFR calculation is accurate only with a steady state creatinine    Magnesium [[id number]]  (Normal) Collected: 05/09/23 0642    Lab Status: Final result Specimen: Blood from Arm, Right Updated: 05/09/23 0738     Magnesium 1 9 mg/dL     Salicylate level [[id number]]  (Normal) Collected: 05/09/23 9398    Lab Status: Final result Specimen: Blood from Arm, Right Updated: 40/98/55 5482     Salicylate Lvl <5 mg/dL     Acetaminophen level-If concentration is detectable, please discuss with medical  on call   [[id number]]  (Abnormal) Collected: 05/09/23 0642    Lab Status: Final result Specimen: Blood from Arm, Right Updated: 05/09/23 0738     Acetaminophen Level <10 ug/mL     HS Troponin 0hr (reflex protocol) [[id number]]  (Normal) Collected: 05/09/23 0642    Lab Status: Final result Specimen: Blood from Arm, Right Updated: 05/09/23 0716     hs TnI 0hr 33 ng/L     HS Troponin I 2hr [[id number]]     Lab Status: No result Specimen: Blood     Ethanol [[id number]]  (Normal) Collected: 05/09/23 0642    Lab Status: Final result Specimen: Blood from Arm, Right Updated: 05/09/23 0707     Ethanol Lvl <10 mg/dL     Lactic acid, plasma (w/reflex if result > 2 0) [[id number]]  (Normal) Collected: 05/09/23 0642    Lab Status: Final result Specimen: Blood from Arm, Right Updated: 05/09/23 0707     LACTIC ACID 1 1 mmol/L     Narrative:      Result may be elevated if tourniquet was used during collection  Fingerstick Glucose (POCT) [[id number]]  (Normal) Collected: 05/09/23 0702    Lab Status: Final result Updated: 05/09/23 0703     POC Glucose 88 mg/dl     CBC and differential [[id number]]  (Abnormal) Collected: 05/09/23 0642    Lab Status: Final result Specimen: Blood from Arm, Right Updated: 05/09/23 0652     WBC 10 68 Thousand/uL      RBC 5 45 Million/uL      Hemoglobin 16 2 g/dL      Hematocrit 49 1 %      MCV 90 fL      MCH 29 7 pg      MCHC 33 0 g/dL      RDW 12 5 %      MPV 9 5 fL      Platelets 197 Thousands/uL      nRBC 0 /100 WBCs      Neutrophils Relative 76 %      Immat GRANS % 0 %      Lymphocytes Relative 17 %      Monocytes Relative 6 %      Eosinophils Relative 1 %      Basophils Relative 0 %      Neutrophils Absolute 8 13 Thousands/µL      Immature Grans Absolute 0 04 Thousand/uL      Lymphocytes Absolute 1 76 Thousands/µL      Monocytes Absolute 0 62 Thousand/µL      Eosinophils Absolute 0 09 Thousand/µL      Basophils Absolute 0 04 Thousands/µL                  XR chest 2 views   Final Result by Terral Epley, DO (05/09 3642)      No acute cardiopulmonary disease is seen  Workstation performed: [id number]                    Procedures  ECG 12 Lead Documentation Only    Date/Time: 5/9/2023 6:05 AM  Performed by: Louis Pool   Authorized by:  CHINA Pool     Indications / Diagnosis:  AMS/generalized weakness  ECG reviewed by me, the ED Provider: yes    Patient location:  ED  Previous ECG: Previous ECG:  Compared to current    Comparison ECG info:  5/7/23    Similarity:  Changes noted (Sinus bradycardia has replaced sinus tachycardia)  Rate:     ECG rate:  51    ECG rate assessment: bradycardic    Rhythm:     Rhythm: sinus bradycardia    Ectopy:     Ectopy: none    QRS:     QRS axis:  Normal    QRS intervals:  Normal  Conduction:     Conduction: normal    ST segments:     ST segments:  Normal  T waves:     T waves: normal    Comments:      Sinus bradycardia, normal axis, normal intervals, no acute ischemic changes read by me             ED Course  ED Course as of 05/09/23 0913   Tue May 09, 2023   0716 XR chest 2 views  IMPRESSION:     No acute cardiopulmonary disease is seen       0716 hs TnI 0hr: 33  Troponin downtrending from last measure 2 days ago  Will delta   0743 Comprehensive metabolic panel  No electrolyte derangement, no LAWRENCE, no transaminitis or elevated total bilirubin to suggest acute hepatobiliary pathology  Patient without abdominal pain, or N/V/D    1007 Magnesium: 1 9  WDL   0748 UA w Reflex to Microscopic w Reflex to Culture  Negative for hematuria or infection  5394 Patient updated on results, he continues to rest comfortably in bed  HR noted to be sinus rhythm 50s, remainder of VSS  He denies pain  0809 Rapid drug screen, urine(!)  Noted positive drug screen   Mare Right Dr Leah Alexandre regarding pt case                                             Medical Decision Making  DDx including but not limited to: metabolic abnormality, dehydration, viral illness, anemia, ACS, MI, intracranial process, other infectious process including UTI, polysubstance abuse    Patient is a 56-year-old male with PMH of IVDA and polysubstance abuse presenting for evaluation of 2 days of generalized weakness and fatigue since being seen in the ED for seizure-like activity and elevated troponins  His triage vital signs are stable  On initial examination, patient in NAD and appearing drowsy    His physical exam is notable for multidirectional nystagmus and dysarthria  His wife at bedside notes she has seen this for the last 1 to 2 days and is different than his baseline  He also has an abnormal gait and ataxia  Suspicion is highest for polysubstance abuse and PCP, however cannot rule out CVA  Review of the patient's chart notes CT head imaging from 2 days ago unremarkable  I discussed the abnormal neuro findings with my attending Dr Marshall Hensley who advises for admission for formal MRI imaging given patient is outside stroke window  Broad work-up initiated given generalized weakness and abnormal neurologic exam   Blood work with downtrending troponin from last measure 2 days ago  Remainder of labs otherwise unremarkable  UA notable for positive benzos and PCP  Suspicion is high for PCP as causative factor for these abnormal neurologic symptoms, however I cannot rule out CVA especially given recent abnormal troponins in the setting of seizure-like activity  Discussed case with Dr Elizabeth Messina from inpatient medicine who advised for inpatient medicine for formal MRI to rule out central cause of patient's nystagmus and abnormal neuro exam   Patient given IV fluid resuscitation  He is agreeable to admission and has no further questions at this time  Generalized weakness: acute illness or injury  Multidirectional nystagmus: acute illness or injury  Polysubstance abuse ([address] ): acute illness or injury  Amount and/or Complexity of Data Reviewed  Labs: ordered  Decision-making details documented in ED Course  Radiology: ordered  Decision-making details documented in ED Course  Risk  Decision regarding hospitalization            Disposition  Final diagnoses:   Generalized weakness   Multidirectional nystagmus   Polysubstance abuse ([address] )     Time reflects when diagnosis was documented in both MDM as applicable and the Disposition within this note     Time User Action Codes Description Comment    5/9/2023  8:27 AM Arabella Verde Add [R53 1] Generalized weakness     5/9/2023  8:27 AM Arabella Verde Add [F12 52] Multidirectional nystagmus     5/9/2023  8:27 AM Ysabel Mclean Eliasarelis Gross Add [F19 10] Polysubstance abuse [facility])       ED Disposition     ED Disposition   Admit    Condition   Stable    Date/Time   Tue May 9, 2023  8:27 AM    Comment   Case was discussed with Dr Neptali Valdez and the patient's admission status was agreed to be Admission Status: inpatient status to the service of Dr Neptali Valdez   Follow-up Information    None         Patient's Medications   Discharge Prescriptions    No medications on file       No discharge procedures on file      PDMP Review       Value Time User    PDMP Reviewed  Yes 11/3/2021 10:42 AM Kate Campbell MD          ED Provider  Electronically Signed by           Silver Ca  05/09/23 0305

## 2023-05-09 NOTE — ASSESSMENT & PLAN NOTE
Fransisco Hussein is a 34 y o  male with polysubstance abuse on Suboxone, tobacco abuse, ADHD, recent ER visit for witnessed seizure activity (5/7/2023) who presents to [facility] ED on 5/9/2023 for slurred speech and disorientation since signing out [place] during his recent ED visit on 5/7  Work-up:  - CT head 5/7/2023: Unremarkable for acute intracranial normalities    - Labs on 5/7/2023:  · Troponin elevated 130  · Rapid drug urine positive for: Amph/Meth, benzodiazepine, cocaine, PCP, THC  · Labs WNL: Coma panel  - Labs this presentation 5/9/2023:  · Leukocytosis, 10 68  · Total CK elevated, 781  · Rapid drug urine positive for: Benzodiazepine and PCP  · Labs WNL: Coma panel, magnesium, lactic acid, troponin, UA    New onset seizure, likely secondary to substance abuse as urine drug screen was found to be positive for Amph/Meth, benzodiazepine, cocaine, PCP, and THC when he presented to the ED following the initial witnessed seizure activity on 5/7/2023  Concern Suboxone may also contribute in lowering seizure threshold      Plan:  - Okay to hold off on repeating stat neuroimaging at this time including CT head  - MRI brain w wo seizure protocol pending  - Routine EEG pending  - We will hold off on initiating AEDs at this time  - Seizure precautions  - [employer] form will need to be completed discharge  - Correction of infectious/metabolic derangements per primary team  - Medical management and supportive care per primary team, notify with changes

## 2023-05-09 NOTE — CONSULTS
Consultation - Neurology   Sara Samuel 34 y o  male MRN: [id number]  Unit/Bed#: ED-18 Encounter: [id number]      Assessment/Plan   * Seizure-like activity [facility])  Assessment & Plan  Sara Samuel is a 34 y o  male with polysubstance abuse on Suboxone, tobacco abuse, ADHD, recent ER visit for witnessed seizure activity (5/7/2023) who presents to [facility] ED on 5/9/2023 for slurred speech and disorientation since signing out [place] during his recent ED visit on 5/7  Work-up:  - CT head 5/7/2023: Unremarkable for acute intracranial normalities    - Labs on 5/7/2023:  · Troponin elevated 130  · Rapid drug urine positive for: Amph/Meth, benzodiazepine, cocaine, PCP, THC  · Labs WNL: Coma panel  - Labs this presentation 5/9/2023:  · Leukocytosis, 10 68  · Total CK elevated, 781  · Rapid drug urine positive for: Benzodiazepine and PCP  · Labs WNL: Coma panel, magnesium, lactic acid, troponin, UA    New onset seizure, likely secondary to substance abuse as urine drug screen was found to be positive for Amph/Meth, benzodiazepine, cocaine, PCP, and THC when he presented to the ED following the initial witnessed seizure activity on 5/7/2023  Concern Suboxone may also contribute in lowering seizure threshold      Plan:  - Okay to hold off on repeating stat neuroimaging at this time including CT head  - MRI brain w wo seizure protocol pending  - Routine EEG pending  - We will hold off on initiating AEDs at this time  - Seizure precautions  - [employer] form will need to be completed discharge  - Correction of infectious/metabolic derangements per primary team  - Medical management and supportive care per primary team, notify with changes    Polysubstance abuse [facility])  Assessment & Plan  - Per chart review, wife reported patient had been battling drug addiction the past 10 years  - 5/7/2023 Rapid drug urine positive for: Amph/Meth, benzodiazepine, cocaine, PCP, THC  - 5/9/2023 Rapid drug urine positive for: "Benzodiazepine and PCP  - Patient is currently on Suboxone 1 mg BID and Klonopin 1 mg BID  - Management per primary team    Surya Eden will need follow up in 8 weeks with epilepsy attending or advance practitioner  He will not require outpatient neurological testing  History of Present Illness     Reason for Consult / Principal Problem: Seizure-like activity    HPI: Surya Eden is a 34 y o   male with substance abuse on Suboxone, ADHD, recent ER visit for witnessed seizure activity where patient was thought to have had an NSTEMI (5/7/2023) who presents to [place] [facility] ED on 5/9/2023 for slurred speech and disorientation since he signed out [facility] during his recent ED visit on 5/7  History obtained per discussion with patient and per chart review  Patient reports on Sunday he took some PCP, Clonazepam, fentanyl, and Suboxone  Patient was later witnessed by his wife to have a seizure  Per recent ED note on 5/7/2023,   \"   he was sitting in a chair on their front porch when he began to  both arm rests firmly and shake over his whole body  She reports the shaking was causing the entire porch to shake  She attempted to get him out of the chair but was unable to  She reports the shaking lasted for about 2 minutes  She says he was confused and somnolent after the presumed seizure and only really became coherent once in the back of the ambulance  \"  During this ED visit patient was found to have urine drug screen positive for Amph/Meth, benzodiazepine, cocaine, PCP, and THC  CT head was unremarkable for acute intracranial abnormalities  Patient was also found to have an elevated troponin of 130  Unfortunately patient left AGAINST MEDICAL ADVICE  Per chart review, wife reported patient has never had a seizure in the past   Patient denies any significant head trauma  Patient reports since being home from the recent ED visit, wife noticed slurred speech and reported disorientation    Patient " states he had a seizure last night 5/8/2023  Patient describes episode as standing in the middle of his kitchen when his legs began to shake and he broke out into a sweat  Patient states he was aware of the shaking in his legs at the time and denies any shaking in his upper extremities  Patient denies any LOC, alteration in awareness, or head trauma at this time  Patient admits to smoking cigarettes  Denies marijuana use, methamphetamine use, cocaine use  Patient denies chest pain, SOB, abdominal pain, nausea/vomiting, headache, visual changes, weakness, numbness/tingling  Inpatient consult to Neurology  Consult performed by: Jessika Mcdonald PA-C  Consult ordered by: Hi Cantrell MD        Review of Systems  12 point ROS performed, as stated above, all others negative  Historical Information   Past Medical History:   Diagnosis Date   • ADHD (attention deficit hyperactivity disorder)    • Anxiety    • Asthma      History reviewed  No pertinent surgical history  Social History   Social History     Substance and Sexual Activity   Alcohol Use Yes    Comment: weekends     Social History     Substance and Sexual Activity   Drug Use Yes   • Types: Marijuana    Comment: OCCASIONAL  E-Cigarette/Vaping   • E-Cigarette Use Never User      E-Cigarette/Vaping Substances   • Nicotine No    • THC No    • CBD No    • Flavoring No    • Other No    • Unknown No      Social History     Tobacco Use   Smoking Status Every Day   • Packs/day: 0 50   • Years: 7 00   • Pack years: 3 50   • Types: Cigarettes   Smokeless Tobacco Never     Family History:   Family History   Problem Relation Age of Onset   • Heart disease Father    • Hypertension Father    • Hodgkin's lymphoma Father    • Hypertension Paternal Grandfather    • No Known Problems Mother        Review of previous medical records was completed      Meds/Allergies   all current active meds have been reviewed, current meds:   Current Facility-Administered Medications   Medication Dose Route Frequency   • acetaminophen (TYLENOL) tablet 650 mg  650 mg Oral Q6H PRN   • buprenorphine-naloxone (Suboxone) film 8 mg  8 mg Sublingual BID   • clonazePAM (KlonoPIN) tablet 1 mg  1 mg Oral BID   • enoxaparin (LOVENOX) subcutaneous injection 40 mg  40 mg Subcutaneous Daily    and PTA meds:   Prior to Admission Medications   Prescriptions Last Dose Informant Patient Reported? Taking? buprenorphine (SUBUTEX) 8 mg 5/8/2023  Yes Yes   Sig: Place 8 mg under the tongue 2 (two) times a day   clonazePAM (KlonoPIN) 1 mg tablet 5/8/2023  Yes Yes   Sig: Take 1 mg by mouth 2 (two) times a day   ondansetron (ZOFRAN-ODT) 4 mg disintegrating tablet More than a month  No No   Sig: Take 1 tablet (4 mg total) by mouth every 6 (six) hours as needed for nausea or vomiting      Facility-Administered Medications: None       No Known Allergies    Objective   Vitals:Blood pressure 122/60, pulse 75, temperature 97 9 °F (36 6 °C), temperature source Oral, resp  rate 16, height 6' (1 829 m), weight 93 9 kg (207 lb), SpO2 98 %  ,Body mass index is 28 07 kg/m²  Intake/Output Summary (Last 24 hours) at 5/9/2023 1527  Last data filed at 5/9/2023 0938  Gross per 24 hour   Intake 1000 ml   Output --   Net 1000 ml       Invasive Devices: Invasive Devices     Peripheral Intravenous Line  Duration           Peripheral IV 05/09/23 Right Antecubital <1 day              Physical Exam  Vitals and nursing note reviewed  Constitutional:       General: He is not in acute distress  Appearance: He is normal weight  He is not ill-appearing, toxic-appearing or diaphoretic  HENT:      Head: Normocephalic and atraumatic  Eyes:      General: No scleral icterus  Right eye: No discharge  Left eye: No discharge  Extraocular Movements: Extraocular movements intact and EOM normal       Conjunctiva/sclera: Conjunctivae normal       Pupils: Pupils are equal, round, and reactive to light  Musculoskeletal:         General: Normal range of motion  Cervical back: Normal range of motion and neck supple  Skin:     General: Skin is warm and dry  Coloration: Skin is not jaundiced or pale  Neurological:      Mental Status: He is alert  Motor: Motor strength is normal    Psychiatric:         Mood and Affect: Mood normal          Behavior: Behavior normal        Neurologic Exam     Mental Status   Patient is alert, sitting up in bed  Oriented to person, place, month, and year  Able to follow central and appendicular commands  Able to follow multistep commands  Answers all questions appropriately  No dysarthria or aphasia on examination  Cranial Nerves     CN II   Visual fields full to confrontation  CN III, IV, VI   Pupils are equal, round, and reactive to light  Extraocular motions are normal    Pupils: (Pupils dilated, 4 mm bilaterally)  Nystagmus: none   Upgaze: normal  Downgaze: normal  Conjugate gaze: present    CN VII   Facial expression full, symmetric  CN VIII   Hearing: intact    CN XI   CN XI normal      CN XII   Tongue deviation: none    Motor Exam   Muscle bulk: normal  Overall muscle tone: normal  Right arm pronator drift: absent  Left arm pronator drift: absent    Strength   Strength 5/5 throughout  Sensory Exam   Light touch normal    Pinprick normal    No evidence of extinction with bilateral simultaneous stimulation      Gait, Coordination, and Reflexes     Tremor   Resting tremor: absent  No ataxia or dysmetria noted in BUE finger to nose testing     Bilateral upper and lower extrmeity reflexes 2+ throughout, symmetric     Bilateral toes down going     No involuntary movements or rhythmic seizure-like activity noted throughout exam     Lab Results: I have personally reviewed pertinent reports    Recent Results (from the past 24 hour(s))   ECG 12 lead    Collection Time: 05/09/23  6:04 AM   Result Value Ref Range    Ventricular Rate 51 BPM    Atrial Rate "51 BPM    IN Interval 176 ms    QRSD Interval 86 ms    QT Interval 444 ms    QTC Interval 409 ms    P Axis 52 degrees    QRS Axis 77 degrees    T Wave Axis 64 degrees   Comprehensive metabolic panel    Collection Time: 05/09/23  6:42 AM   Result Value Ref Range    Sodium 138 135 - 147 mmol/L    Potassium 4 5 3 5 - 5 3 mmol/L    Chloride 104 96 - 108 mmol/L    CO2 28 21 - 32 mmol/L    ANION GAP 6 4 - 13 mmol/L    BUN 12 5 - 25 mg/dL    Creatinine 0 75 0 60 - 1 30 mg/dL    Glucose 101 65 - 140 mg/dL    Calcium 9 9 8 4 - 10 2 mg/dL    AST 38 13 - 39 U/L    ALT 45 7 - 52 U/L    Alkaline Phosphatase 80 34 - 104 U/L    Total Protein 7 5 6 4 - 8 4 g/dL    Albumin 4 4 3 5 - 5 0 g/dL    Total Bilirubin 0 67 0 20 - 1 00 mg/dL    eGFR 122 ml/min/1 73sq m   Magnesium    Collection Time: 05/09/23  6:42 AM   Result Value Ref Range    Magnesium 1 9 1 9 - 2 7 mg/dL   Lactic acid, plasma (w/reflex if result > 2 0)    Collection Time: 05/09/23  6:42 AM   Result Value Ref Range    LACTIC ACID 1 1 0 5 - 2 0 mmol/L   HS Troponin 0hr (reflex protocol)    Collection Time: 05/09/23  6:42 AM   Result Value Ref Range    hs TnI 0hr 33 \"Refer to ACS Flowchart\"- see link ng/L   CBC and differential    Collection Time: 05/09/23  6:42 AM   Result Value Ref Range    WBC 10 68 (H) 4 31 - 10 16 Thousand/uL    RBC 5 45 3 88 - 5 62 Million/uL    Hemoglobin 16 2 12 0 - 17 0 g/dL    Hematocrit 49 1 36 5 - 49 3 %    MCV 90 82 - 98 fL    MCH 29 7 26 8 - 34 3 pg    MCHC 33 0 31 4 - 37 4 g/dL    RDW 12 5 11 6 - 15 1 %    MPV 9 5 8 9 - 12 7 fL    Platelets 670 033 - 497 Thousands/uL    nRBC 0 /100 WBCs    Neutrophils Relative 76 (H) 43 - 75 %    Immat GRANS % 0 0 - 2 %    Lymphocytes Relative 17 14 - 44 %    Monocytes Relative 6 4 - 12 %    Eosinophils Relative 1 0 - 6 %    Basophils Relative 0 0 - 1 %    Neutrophils Absolute 8 13 (H) 1 85 - 7 62 Thousands/µL    Immature Grans Absolute 0 04 0 00 - 0 20 Thousand/uL    Lymphocytes Absolute 1 76 0 60 - 4 47 " "Thousands/µL    Monocytes Absolute 0 62 0 17 - 1 22 Thousand/µL    Eosinophils Absolute 0 09 0 00 - 0 61 Thousand/µL    Basophils Absolute 0 04 0 00 - 0 10 Thousands/µL   Ethanol    Collection Time: 05/09/23  6:42 AM   Result Value Ref Range    Ethanol Lvl <67 <29 mg/dL   Salicylate level    Collection Time: 05/09/23  6:42 AM   Result Value Ref Range    Salicylate Lvl <5 3 - 20 mg/dL   Acetaminophen level-If concentration is detectable, please discuss with medical  on call      Collection Time: 05/09/23  6:42 AM   Result Value Ref Range    Acetaminophen Level <10 (L) 10 - 20 ug/mL   CK    Collection Time: 05/09/23  6:42 AM   Result Value Ref Range    Total  (H) 39 - 308 U/L   Fingerstick Glucose (POCT)    Collection Time: 05/09/23  7:02 AM   Result Value Ref Range    POC Glucose 88 65 - 140 mg/dl   UA w Reflex to Microscopic w Reflex to Culture    Collection Time: 05/09/23  7:32 AM    Specimen: Urine, Clean Catch   Result Value Ref Range    Color, UA Light Yellow     Clarity, UA Clear     Specific Gravity, UA 1 009 1 003 - 1 030    pH, UA 7 0 4 5, 5 0, 5 5, 6 0, 6 5, 7 0, 7 5, 8 0    Leukocytes, UA Negative Negative    Nitrite, UA Negative Negative    Protein, UA Negative Negative mg/dl    Glucose, UA Negative Negative mg/dl    Ketones, UA Negative Negative mg/dl    Urobilinogen, UA <2 0 <2 0 mg/dl mg/dl    Bilirubin, UA Negative Negative    Occult Blood, UA Negative Negative   Rapid drug screen, urine    Collection Time: 05/09/23  7:32 AM   Result Value Ref Range    Amph/Meth UR Negative Negative    Barbiturate Ur Negative Negative    Benzodiazepine Urine Positive (A) Negative    Cocaine Urine Negative Negative    Methadone Urine Negative Negative    Opiate Urine Negative Negative    PCP Ur Positive (A) Negative    THC Urine Negative Negative    Oxycodone Urine Negative Negative   HS Troponin I 2hr    Collection Time: 05/09/23  9:38 AM   Result Value Ref Range    hs TnI 2hr 30 \"Refer to ACS " "Flowchart\"- see link ng/L    Delta 2hr hsTnI -3 <20 ng/L   Fingerstick Glucose (POCT)    Collection Time: 05/09/23 12:15 PM   Result Value Ref Range    POC Glucose 107 65 - 140 mg/dl   ]  Imaging Studies: I have personally reviewed pertinent reports and I have personally reviewed pertinent films in PACS  EKG, Pathology, and Other Studies: I have personally reviewed pertinent reports  VTE Prophylaxis: Enoxaparin (Lovenox)    Dictation voice to text software has been used in the creation of this document  Please consider this in light of any contextual or grammatical errors     "

## 2023-05-09 NOTE — ASSESSMENT & PLAN NOTE
· Urine drug screen on admission positive for benzodiazepine and PCP    Urine drug test on 05/07 was positive for cocaine, PCP, methamphetamine, THC  · On admission he denied any drug use however on further questioning he did endorse using PCP and fentanyl a few days prior to the seizure activity  · He is currently on Suboxone 1 mg twice daily and Klonopin 1 mg twice daily  · On PDMP both medications were last filled on 04/4/23  90 tablets of Klonopin and 60 tablets of Subutex

## 2023-05-10 ENCOUNTER — APPOINTMENT (INPATIENT)
Dept: NEUROLOGY | Facility: HOSPITAL | Age: 33
End: 2023-05-10

## 2023-05-10 VITALS
DIASTOLIC BLOOD PRESSURE: 81 MMHG | HEART RATE: 57 BPM | SYSTOLIC BLOOD PRESSURE: 141 MMHG | BODY MASS INDEX: 28.04 KG/M2 | HEIGHT: 72 IN | TEMPERATURE: 98.2 F | WEIGHT: 207 LBS | RESPIRATION RATE: 17 BRPM | OXYGEN SATURATION: 98 %

## 2023-05-10 LAB
ANION GAP SERPL CALCULATED.3IONS-SCNC: 8 MMOL/L (ref 4–13)
ATRIAL RATE: 51 BPM
BUN SERPL-MCNC: 12 MG/DL (ref 5–25)
CALCIUM SERPL-MCNC: 8.9 MG/DL (ref 8.4–10.2)
CHLORIDE SERPL-SCNC: 106 MMOL/L (ref 96–108)
CO2 SERPL-SCNC: 21 MMOL/L (ref 21–32)
CREAT SERPL-MCNC: 0.76 MG/DL (ref 0.6–1.3)
ERYTHROCYTE [DISTWIDTH] IN BLOOD BY AUTOMATED COUNT: 12.5 % (ref 11.6–15.1)
GFR SERPL CREATININE-BSD FRML MDRD: 121 ML/MIN/1.73SQ M
GLUCOSE SERPL-MCNC: 100 MG/DL (ref 65–140)
HCT VFR BLD AUTO: 47.9 % (ref 36.5–49.3)
HGB BLD-MCNC: 15.4 G/DL (ref 12–17)
MCH RBC QN AUTO: 29.8 PG (ref 26.8–34.3)
MCHC RBC AUTO-ENTMCNC: 32.2 G/DL (ref 31.4–37.4)
MCV RBC AUTO: 93 FL (ref 82–98)
P AXIS: 52 DEGREES
PLATELET # BLD AUTO: 150 THOUSANDS/UL (ref 149–390)
PMV BLD AUTO: 10.4 FL (ref 8.9–12.7)
POTASSIUM SERPL-SCNC: 4 MMOL/L (ref 3.5–5.3)
PR INTERVAL: 176 MS
QRS AXIS: 77 DEGREES
QRSD INTERVAL: 86 MS
QT INTERVAL: 444 MS
QTC INTERVAL: 409 MS
RBC # BLD AUTO: 5.16 MILLION/UL (ref 3.88–5.62)
SODIUM SERPL-SCNC: 135 MMOL/L (ref 135–147)
T WAVE AXIS: 64 DEGREES
VENTRICULAR RATE: 51 BPM
WBC # BLD AUTO: 9.66 THOUSAND/UL (ref 4.31–10.16)

## 2023-05-10 RX ADMIN — BUPRENORPHINE AND NALOXONE 8 MG: 8; 2 FILM BUCCAL; SUBLINGUAL at 09:36

## 2023-05-10 RX ADMIN — CLONAZEPAM 1 MG: 1 TABLET ORAL at 09:37

## 2023-05-10 RX ADMIN — ENOXAPARIN SODIUM 40 MG: 40 INJECTION SUBCUTANEOUS at 09:37

## 2023-05-10 NOTE — PROGRESS NOTES
[facility]  Progress Note  Name: Valeria Bui  MRN: [id number]  Unit/Bed#: S -01 I Date of Admission: 5/9/2023   Date of Service: 5/10/2023 I Hospital Day: 1    Assessment/Plan   * Seizure-like activity [facility])  Assessment & Plan  Patient endorses 2-3 incidences of seizure like activity after polysubstance abuse on Saturday  Patient was initially admitted but left AMA, and came back after having another instance and was possibly postictal as he was complaining of lethargy  Patient takes Suboxone and Klonopin at home  Seizure activity likely secondary to substance abuse versus withdrawal      · MRI seizure was negative  · EEG pending  · Neurology following  · No recommendations for AED at this time and will need [employer] form filled out  Bradycardia  Assessment & Plan  On admission HR 61>57  EKG showing sinus Bradycardia  Asymptomatic at this time     Polysubstance abuse [facility])  Assessment & Plan  · Urine drug screen on admission positive for benzodiazepine and PCP  Urine drug test on 05/07 was positive for cocaine, PCP, methamphetamine, THC  · On admission he denied any drug use however on further questioning he did endorse using PCP and fentanyl a few days prior to the seizure activity  · He is currently on Suboxone 1 mg twice daily and Klonopin 1 mg twice daily  · On PDMP both medications were last filled on 04/4/23  90 tablets of Klonopin and 60 tablets of Subutex      Attention deficit disorder  Assessment & Plan  Reports that he is no longer taking Adderall               VTE Pharmacologic Prophylaxis:   VTE Score: 3 Moderate Risk (Score 3-4) - Pharmacological DVT Prophylaxis Ordered: Enoxaparin (Lovenox)  Mechanical VTE Prophylaxis in Place: Yes    Patient Centered Rounds: I have performed bedside rounds with nursing staff today      Discussions with Specialists or Other Care Team Provider: Neurology    Education and Discussions with Family / Patient: Patient declined call to   Current Length of Stay: 1 day(s)    Current Patient Status: Inpatient     Discharge Plan / Estimated Discharge Date: Anticipate discharge tomorrow to home  Code Status: Level 1 - Full Code      Subjective:   Patient is doing better today  Denies any complaints or seizure-like activity  No acute overnight events  Objective:     Vitals:   Temp (24hrs), Av 5 °F (36 9 °C), Min:98 °F (36 7 °C), Max:98 8 °F (37 1 °C)    Temp:  [98 °F (36 7 °C)-98 8 °F (37 1 °C)] 98 °F (36 7 °C)  HR:  [53-75] 59  Resp:  [16-18] 17  BP: (122-153)/(60-84) 140/81  SpO2:  [94 %-98 %] 94 %  Body mass index is 28 07 kg/m²  Input and Output Summary (last 24 hours): Intake/Output Summary (Last 24 hours) at 5/10/2023 1144  Last data filed at 5/10/2023 0700  Gross per 24 hour   Intake 118 ml   Output --   Net 118 ml       Physical Exam:     Physical Exam ***    Additional Data:     Labs:  Results from last 7 days   Lab Units 05/10/23  0458 23  1532 23  0642   WBC Thousand/uL 9 66  --  10 68*   HEMOGLOBIN g/dL 15 4  --  16 2   HEMATOCRIT % 47 9  --  49 1   PLATELETS Thousands/uL 150   < > 193   NEUTROS PCT %  --   --  76*   LYMPHS PCT %  --   --  17   MONOS PCT %  --   --  6   EOS PCT %  --   --  1    < > = values in this interval not displayed       Results from last 7 days   Lab Units 05/10/23  0458 23  0642   SODIUM mmol/L 135 138   POTASSIUM mmol/L 4 0 4 5   CHLORIDE mmol/L 106 104   CO2 mmol/L 21 28   BUN mg/dL 12 12   CREATININE mg/dL 0 76 0 75   ANION GAP mmol/L 8 6   CALCIUM mg/dL 8 9 9 9   ALBUMIN g/dL  --  4 4   TOTAL BILIRUBIN mg/dL  --  0 67   ALK PHOS U/L  --  80   ALT U/L  --  45   AST U/L  --  38   GLUCOSE RANDOM mg/dL 100 101         Results from last 7 days   Lab Units 23  1215 23  0702 23  2110   POC GLUCOSE mg/dl 107 88 86         Results from last 7 days   Lab Units 23  0642   LACTIC ACID mmol/L 1 1       Imaging: Reviewed radiology reports from this admission including: MRI brain    Recent Cultures (last 7 days):           Lines/Drains:  Invasive Devices     Peripheral Intravenous Line  Duration           Peripheral IV 05/09/23 Right Antecubital 1 day                Telemetry:        Last 24 Hours Medication List:   Current Facility-Administered Medications   Medication Dose Route Frequency Provider Last Rate   • acetaminophen  650 mg Oral Q6H PRN Scott Deleon MD     • buprenorphine-naloxone  8 mg Sublingual BID Scott Deleon MD     • clonazePAM  1 mg Oral BID Scott Deleon MD     • enoxaparin  40 mg Subcutaneous Daily Scott Deleon MD          Today, Patient Was Seen By: Gisell Rodriguez MD    ** Please Note: This note has been constructed using a voice recognition system   **

## 2023-05-10 NOTE — DISCHARGE INSTR - AVS FIRST PAGE
Dear Mandy Gutierres,     It was our pleasure to care for you here at [facility]  It is our hope that we were always able to exceed the expected standards for your care during your stay  You were hospitalized due to seizure like activity  You were cared for on the 4th floor by Soila Alejandre MD under the service of Quincy Hillman MD with the [facility] who covers for your primary care physician (PCP), Mariposa Rolon DO, while you were hospitalized  If you have any questions or concerns related to this hospitalization, you may contact us at [phone]  For follow up as well as any medication refills, we recommend that you follow up with your primary care physician  A registered nurse will reach out to you by phone within a few days after your discharge to answer any additional questions that you may have after going home  However, at this time we provide for you here, the most important instructions / recommendations at discharge:     Notable Medication Adjustments -   Take medications as prescribed  Testing Required after Discharge -   None    Important follow up information -   Follow up with PCP  Other Instructions -   None  Please review this entire after visit summary as additional general instructions including medication list, appointments, activity, diet, any pertinent wound care, and other additional recommendations from your care team that may be provided for you        Sincerely,     Soila Alejandre MD

## 2023-05-10 NOTE — PLAN OF CARE
Problem: PAIN - ADULT  Goal: Verbalizes/displays adequate comfort level or baseline comfort level  Description: Interventions:  - Encourage patient to monitor pain and request assistance  - Assess pain using appropriate pain scale  - Administer analgesics based on type and severity of pain and evaluate response  - Implement non-pharmacological measures as appropriate and evaluate response  - Consider cultural and social influences on pain and pain management  - Notify physician/advanced practitioner if interventions unsuccessful or patient reports new pain  Outcome: Progressing     Problem: Potential for Falls  Goal: Patient will remain free of falls  Description: INTERVENTIONS:  - Educate patient/family on patient safety including physical limitations  - Instruct patient to call for assistance with activity   - Consult OT/PT to assist with strengthening/mobility   - Keep Call bell within reach  - Keep bed low and locked with side rails adjusted as appropriate  - Keep care items and personal belongings within reach  - Initiate and maintain comfort rounds  Outcome: Progressing     Problem: DISCHARGE PLANNING  Goal: Discharge to home or other facility with appropriate resources  Description: INTERVENTIONS:  - Identify barriers to discharge w/patient and caregiver  - Arrange for needed discharge resources and transportation as appropriate  - Identify discharge learning needs (meds, wound care, etc )  - Arrange for interpretive services to assist at discharge as needed  - Refer to Case Management Department for coordinating discharge planning if the patient needs post-hospital services based on physician/advanced practitioner order or complex needs related to functional status, cognitive ability, or social support system  Outcome: Progressing

## 2023-05-10 NOTE — ASSESSMENT & PLAN NOTE
Patient endorses 2-3 incidences of seizure like activity after polysubstance abuse on Saturday  Patient was initially admitted but left AMA, and came back after having another instance and was possibly postictal as he was complaining of lethargy  Patient takes Suboxone and Klonopin at home  Seizure activity likely secondary to substance abuse versus withdrawal      · MRI seizure was negative  · EEG pending  · Neurology following  · No recommendations for AED at this time and will need [employer] form filled out

## 2023-05-10 NOTE — UTILIZATION REVIEW
Initial Clinical Review    Admission: Date/Time/Statement:   Admission Orders (From admission, onward)     Ordered        05/09/23 0828  INPATIENT ADMISSION  Once                      Orders Placed This Encounter   Procedures   • INPATIENT ADMISSION     Standing Status:   Standing     Number of Occurrences:   1     Order Specific Question:   Level of Care     Answer:   Med Surg [16]     Order Specific Question:   Estimated length of stay     Answer:   More than 2 Midnights     Order Specific Question:   Certification     Answer:   I certify that inpatient services are medically necessary for this patient for a duration of greater than two midnights  See H&P and MD Progress Notes for additional information about the patient's course of treatment  ED Arrival Information     Expected   -    Arrival   5/9/2023 05:45    Acuity   Urgent            Means of arrival   Wheelchair    Escorted by   Spouse    Service   Hospitalist    Admission type   Emergency            Arrival complaint   Altered Mental Status           Chief Complaint   Patient presents with   • Medical Problem     Pt presents to ED with wife, had an NSTEMI on Sunday and signed out AMA but pt's wife forced pt to come back for treatment  Pt denies cp, sob, and denies any other complaint       Initial Presentation: 34 y o  male with hx substance abuse on Suboxone and Klonopin ,ADHD who presents to ED from home with seizure-like activity  Pt smoked PCP and Fentanyl prior to  witnessed seizure-like activity on 5/6 and 5/7, described as grand mal  Pt was seen in the ED two days ago for seizure-like activity witnessed by his wife at the time followed by postictal confusion  In ED at that time troponin elevated  Patient left AMA at the time but came back now due to disorientation, slurred speech, generalized weakness, fatigue since he left ED  On exam, pupils dilated, bilat nystagmus, lethargic,has dysarthria, abnormal gait  Labs WBC 10 68, elevated CK  Onita Sport  ECG- SB   CXR shows nothing acute  Pt admitted as Inpatient with seizure like activity, poly substance abuse  Plan - neuro checks, neurology consult, MRI brain , EEG  Neurology consult- No recurrent witnessed seizures as per the patient  His neurological exam is nonfocal at this time, CAT scan of the head done 2 days ago was unremarkable  Rapid drug urine positive for: Benzodiazepine and PCP  Witnessed seizure, generalized in the setting of polysubstance abuse, use of Suboxone which could possibly lower his seizure threshold and hence provoked  No history of seizure disorder or epilepsy-recommend no anticonvulsant at this time   Obtain EEG  Seizure monitoring  Date: 5/10  Day 2:   MRI brain unremarkable for  acute intracranial abnormalities or abnormal enhancement  EEG completed today-normal  Pt awake, alert, 5/5 strength x 4 extremities  [employer] form completed on 5/10/2023  Seizure precautions and 6-month driving restrictions discussed with patient    Per neuro,would not recommend initiating AEDs at this time    ED Triage Vitals   Temperature Pulse Respirations Blood Pressure SpO2   05/09/23 0554 05/09/23 0554 05/09/23 0554 05/09/23 0554 05/09/23 0554   97 9 °F (36 6 °C) 61 20 151/89 100 %      Temp Source Heart Rate Source Patient Position - Orthostatic VS BP Location FiO2 (%)   05/09/23 0554 05/09/23 0554 05/09/23 0554 05/09/23 0554 --   Oral Monitor Sitting Right arm       Pain Score       05/09/23 0730       No Pain          Wt Readings from Last 1 Encounters:   05/09/23 93 9 kg (207 lb)     Additional Vital Signs:   Date/Time Temp Pulse Resp BP MAP (mmHg) SpO2   05/10/23 06:20:29 98 °F (36 7 °C) 59 17 140/81 101 94 %   05/10/23 02:13:42 98 8 °F (37 1 °C) 57 17 148/80 103 97 %   05/09/23 20:51:29 98 4 °F (36 9 °C) 53 Abnormal  18 153/84 107 97 %   05/09/23 19:36:54 98 6 °F (37 °C) 55 -- 127/74 92 97 %   05/09/23 16:23:55 98 5 °F (36 9 °C) 53 Abnormal  16 127/76 93 97 %   05/09/23 1424 -- 75 16 122/60 -- 98 %   05/09/23 1000 -- 54 Abnormal  -- 140/86 110 100 %   05/09/23 0945 -- 54 Abnormal  -- 142/82 107 100 %   05/09/23 0730 -- 54 Abnormal  16 156/75 -- 100 %   05/09/23 0600 -- 57 20 162/89 120 99 %     Date and Time R Pupil Size (mm) L Pupil Size (mm) R Pupil Reaction L Pupil Reaction   05/09/23 2005 3 3 Brisk Brisk   05/09/23 1600 3 3 Brisk Brisk       Pertinent Labs/Diagnostic Test Results:    5/9 ECG- ED read    Rate:     ECG rate:  51     ECG rate assessment: bradycardic     Rhythm:     Rhythm: sinus bradycardia     Ectopy:     Ectopy: none     QRS:     QRS axis:  Normal     QRS intervals:  Normal   Conduction:     Conduction: normal     ST segments:     ST segments:  Normal   T waves:     T waves: normal     Comments:      Sinus bradycardia, normal axis, normal intervals, no acute ischemic   changes read by me  MRI brain seizure wo and w contrast   Final Result by Rio Ye MD (05/10 9179)      Normal examination  Workstation performed: [id number]         XR follow up   Final Result by Claudette Castor, MD (05/09 1724)      No radiopaque orbital foreign body  Workstation performed: [id number]         XR chest 2 views   Final Result by Jennifer Thompson DO (05/09 1192)      No acute cardiopulmonary disease is seen  Workstation performed: [id number]         5/10/23 EEG-This is a normal 30 minutes awake and drowsy EEG        Results from last 7 days   Lab Units 05/10/23  0458 05/09/23  1532 05/09/23  0642 05/07/23  2120   WBC Thousand/uL 9 66  --  10 68* 10 07   HEMOGLOBIN g/dL 15 4  --  16 2 15 0   HEMATOCRIT % 47 9  --  49 1 45 7   PLATELETS Thousands/uL 150 192 193 192   NEUTROS ABS Thousands/µL  --   --  8 13* 6 57         Results from last 7 days   Lab Units 05/10/23  0458 05/09/23  0642 05/07/23  2120   SODIUM mmol/L 135 138 138   POTASSIUM mmol/L 4 0 4 5 3 8   CHLORIDE mmol/L 106 104 100   CO2 mmol/L 21 28 30   ANION GAP mmol/L 8 6 8   BUN mg/dL 12 12 20   CREATININE mg/dL 0 76 0 75 1 20   EGFR ml/min/1 73sq m 121 122 80   CALCIUM mg/dL 8 9 9 9 9 5   MAGNESIUM mg/dL  --  1 9  --      Results from last 7 days   Lab Units 05/09/23  0642 05/07/23 2120   AST U/L 38 52*   ALT U/L 45 51   ALK PHOS U/L 80 75   TOTAL PROTEIN g/dL 7 5 7 4   ALBUMIN g/dL 4 4 4 3   TOTAL BILIRUBIN mg/dL 0 67 0 37     Results from last 7 days   Lab Units 05/09/23  1215 05/09/23  0702 05/07/23  2110   POC GLUCOSE mg/dl 107 88 86     Results from last 7 days   Lab Units 05/10/23  0458 05/09/23  0642 05/07/23  2120   GLUCOSE RANDOM mg/dL 100 101 89           Results from last 7 days   Lab Units 05/09/23  0642   CK TOTAL U/L 781*     Results from last 7 days   Lab Units 05/09/23  1532 05/09/23  0938 05/09/23  0642 05/07/23  2329 05/07/23 2120   HS TNI 0HR ng/L  --   --  33  --  130*   HS TNI 2HR ng/L  --  30  --  172*  --    HSTNI D2 ng/L  --  -3  --  42*  --    HS TNI 4HR ng/L 24  --   --   --   --    HSTNI D4 ng/L -9  --   --   --   --                      Results from last 7 days   Lab Units 05/09/23  0642   LACTIC ACID mmol/L 1 1                       Results from last 7 days   Lab Units 05/09/23  0732   CLARITY UA  Clear   COLOR UA  Light Yellow   SPEC GRAV UA  1 009   PH UA  7 0   GLUCOSE UA mg/dl Negative   KETONES UA mg/dl Negative   BLOOD UA  Negative   PROTEIN UA mg/dl Negative   NITRITE UA  Negative   BILIRUBIN UA  Negative   UROBILINOGEN UA (BE) mg/dl <2 0   LEUKOCYTES UA  Negative             Results from last 7 days   Lab Units 05/09/23  0732   AMPH/METH  Negative   BARBITURATE UR  Negative   BENZODIAZEPINE UR  Positive*   COCAINE UR  Negative   METHADONE URINE  Negative   OPIATE UR  Negative   PCP UR  Positive*   THC UR  Negative     Results from last 7 days   Lab Units 05/09/23  0642 05/07/23  2222   ETHANOL LVL mg/dL <10 <10   ACETAMINOPHEN LVL ug/mL <68* <38*   SALICYLATE LVL mg/dL <5 <5                               ED Treatment:   Medication Administration from 05/09/2023 5945 to 05/09/2023 1549       Date/Time Order Dose Route Action     05/09/2023 0938 EDT lactated ringers bolus 1,000 mL 0 mL Intravenous Stopped     05/09/2023 0641 EDT lactated ringers bolus 1,000 mL 1,000 mL Intravenous New Bag     05/09/2023 1209 EDT enoxaparin (LOVENOX) subcutaneous injection 40 mg 40 mg Subcutaneous Given     05/09/2023 1219 EDT buprenorphine (SUBUTEX) 8 mg SL tablet 8 mg -- Sublingual Canceled Entry     05/09/2023 1209 EDT clonazePAM (KlonoPIN) tablet 1 mg 1 mg Oral Given     05/09/2023 1258 EDT buprenorphine-naloxone (Suboxone) film 8 mg 8 mg Sublingual Given        Past Medical History:   Diagnosis Date   • ADHD (attention deficit hyperactivity disorder)    • Anxiety    • Asthma      Present on Admission:  • Attention deficit disorder      Admitting Diagnosis: Multidirectional nystagmus [H55 09]  Polysubstance abuse ([facility] [address] ) [F19 10]  Seizure-like activity ([facility] [address] ) [R56 9]  Generalized weakness [R53 1]  Age/Sex: 34 y o  male  Admission Orders:  Scheduled Medications:  buprenorphine-naloxone, 8 mg, Sublingual, BID  clonazePAM, 1 mg, Oral, BID  enoxaparin, 40 mg, Subcutaneous, Daily      Continuous IV Infusions:     PRN Meds:  acetaminophen, 650 mg, Oral, Q6H PRN    neuro checks    IP CONSULT TO NEUROLOGY    Network Utilization Review Department  ATTENTION: Please call with any questions or concerns to [phone] and carefully listen to the prompts so that you are directed to the right person  All voicemails are confidential   Sherita Pleasure all requests for admission clinical reviews, approved or denied determinations and any other requests to dedicated fax number below belonging to the campus where the patient is receiving treatment   List of dedicated fax numbers for the Facilities:  FACILITY NAME UR FAX NUMBER   ADMISSION DENIALS (Administrative/Medical Necessity) [phone]   [address] (Maternity/NICU/Pediatrics) Libby Kunz [phone]     Ernestine Irvine [address] [facility] 77 [phone]   [address] [place] [address] [place] [address] [phone] [address] Rico [place] [address] 815 [facility] [phone]

## 2023-05-10 NOTE — CASE MANAGEMENT
Case Management Discharge Planning Note    Patient name Tyrone Tomlinson  Location S /S -29 MRN [id number]  : 1990 Date 5/10/2023       Current Admission Date: 2023  Current Admission Diagnosis:Seizure-like activity [facility])   Patient Active Problem List    Diagnosis Date Noted   • Seizure-like activity ([facility] [address] ) 2023   • Polysubstance abuse ([facility] [address] ) 2023   • Bradycardia 2023   • Preventative health care 2021   • Mild intermittent asthma without complication    • Attention deficit disorder 2021   • Anxiety 2021      LOS (days): 1  Geometric Mean LOS (GMLOS) (days): 2 50  Days to GMLOS:1 3     OBJECTIVE:  Risk of Unplanned Readmission Score: 11 16         Current admission status: Inpatient   Preferred Pharmacy:   [address], [address] [place] [id number]  Phone: [phone] Fax: [phone]    [id number]- [place], [address] [facility]  Phone: [phone] Fax: [phone]    Primary Care Provider: Dat Mantilla DO    Primary Insurance: Walter Miller  Secondary Insurance:     DISCHARGE DETAILS:                                          Other Referral/Resources/Interventions Provided:  Interventions: D&A Warm Handoff         Treatment Team Recommendation: Home  Discharge Destination Plan[de-identified] Home  Transport at Discharge : Family                                         CM received consult for NAKUL/OUD  CM contacted Certified  to refer patient and provided minimal necessary information  CRS to meet with patient and follow up with CM to provide update on plan of care following patient connection  CM department will continue to follow to assist with discharge coordination

## 2023-05-10 NOTE — QUICK NOTE
Witnessed seizure-like activity on 5/7/2023, likely secondary to polysubstance abuse as patient was found to have UDS positive for Amph/Meth, benzodiazepine, cocaine, PCP, THC  CT head at that time unremarkable  MRI brain w wo seizure protocol completed this admission, unremarkable for acute intracranial abnormalities or abnormal enhancement  Routine EEG completed on 5/10/2023 was normal  [employer] form completed on 5/10/2023  Seizure precautions and 6-month driving restrictions discussed with patient at bedside  Would not recommend initiating AEDs at this time  No further inpatient recommendations at this time  Patient to follow-up with outpatient epilepsy attending/advanced practitioner/residency clinic in 8 weeks  Patient will not require any further testing prior to hospital follow-up

## 2023-05-11 NOTE — DISCHARGE SUMMARY
[facility]  Discharge- Tyrone Banegas 1990, 34 y o  male MRN: [id number]  Unit/Bed#: S -01 Encounter: [id number]  Primary Care Provider: Heath Ferguson DO   Date and time admitted to hospital: 5/9/2023  5:50 AM    * Seizure-like activity [facility])  Assessment & Plan  Patient endorses 2-3 incidences of seizure like activity after polysubstance abuse on Saturday  Patient was initially admitted but left AMA, and came back after having another instance and was possibly postictal as he was complaining of lethargy  Patient takes Suboxone and Klonopin at home  Seizure activity likely secondary to substance abuse versus withdrawal      · MRI seizure was negative  · EEG pending  · Neurology following  · No recommendations for AED at this time and will need [employer] form filled out  Bradycardia  Assessment & Plan  On admission HR 61>57  EKG showing sinus Bradycardia  Asymptomatic at this time     Polysubstance abuse [facility])  Assessment & Plan  · Urine drug screen on admission positive for benzodiazepine and PCP    Urine drug test on 05/07 was positive for cocaine, PCP, methamphetamine, THC  · On admission he denied any drug use however on further questioning he did endorse using PCP and fentanyl a few days prior to the seizure activity  · He is currently on Suboxone 1 mg twice daily and Klonopin 1 mg twice daily  · On PDMP both medications were last filled on 04/4/23  90 tablets of Klonopin and 60 tablets of Subutex      Attention deficit disorder  Assessment & Plan  Reports that he is no longer taking Adderall      Discharging Resident Physician: Ngozi Espinoza MD  Attending: No att  providers found  PCP: Heath Ferguson DO  Admission Date: 5/9/2023  Discharge Date: 05/11/23    Disposition:     Home    Reason for Admission: Seizure-like activity    Consultations During Hospital Stay:  · Neurology    Procedures Performed:     · EEG    Significant Findings / Test Results: MRI brain seizure wo and w contrast   Final Result by Jean Paul Steiner MD (05/10 0049)      Normal examination  Workstation performed: [id number]         XR follow up   Final Result by Haydee Cunningham MD (05/09 7181)      No radiopaque orbital foreign body  Workstation performed: [id number]         XR chest 2 views   Final Result by Delma Godwin DO (05/09 0062)      No acute cardiopulmonary disease is seen  Workstation performed: [id number]         ·   ·     Incidental Findings:   ·      Test Results Pending at Discharge (will require follow up):   ·      Outpatient Tests Requested:  ·     Complications: Patient initially left AMA on 5/7 and returned back on 5/9  Hospital Course:     Julito Charles is a 34 y o  male patient with past medical history of polysubstance abuse on Klonopin and Suboxone who originally presented to the hospital on 5/9/2023 due to a witnessed seizure-like activity  Patient initially presented to the ED on 5/7 for similar complaint of seizure-like activity, was also found to have elevated troponins at the time was offered admission to the inpatient service, however patient left [address]  Patient returned on 5/9 with possible postictal state after having another episode of seizure-like activity  Patient had EEG and was evaluated by neurology in the inpatient setting, EEG was normal and patient was cleared by neurology  Patient is to follow-up with neurology outpatient within 8 weeks  Unfortunately, patient did have to have his license revoked for the time being  Patient had [employer] form [place] by neurology  Condition at Discharge: stable     Discharge Day Visit / Exam:     Subjective: Patient did not have any complaints or any acute overnight events    Vitals: Blood Pressure: 141/81 (05/10/23 1510)  Pulse: 57 (05/10/23 1510)  Temperature: 98 2 °F (36 8 °C) (05/10/23 1510)  Temp Source: Oral (05/10/23 1510)  Respirations: 17 (05/10/23 1510)  Height: 6' (182 9 cm) (05/09/23 0945)  Weight - Scale: 93 9 kg (207 lb) (05/09/23 0945)  SpO2: 98 % (05/10/23 1510)    Discussion with Family: Patient's family was updated    Discharge instructions/Information to patient and family:   See after visit summary for information provided to patient and family  Provisions for Follow-Up Care:  See after visit summary for information related to follow-up care and any pertinent home health orders  Planned Readmission: No     Discharge Medications:  See after visit summary for reconciled discharge medications provided to patient and family        ** Please Note: This note has been constructed using a voice recognition system **

## 2023-05-11 NOTE — UTILIZATION REVIEW
NOTIFICATION OF ADMISSION DISCHARGE   This is a Notification of Discharge from [facility]  Please be advised that this patient has been discharge from our facility  Below you will find the admission and discharge date and time including the patient’s disposition  UTILIZATION REVIEW CONTACT:  Geronimo Castle MA  Utilization   Network Utilization Review Department  Phone: [phone] x carefully listen to the prompts  All voicemails are confidential   Email: [email or web address]     ADMISSION INFORMATION  PRESENTATION DATE: 5/9/2023  5:50 AM  OBERVATION ADMISSION DATE:   INPATIENT ADMISSION DATE: 5/9/23  8:28 AM   DISCHARGE DATE: 5/10/2023  4:22 PM   DISPOSITION:Home/Self Care    IMPORTANT INFORMATION:  Send all requests for admission clinical reviews, approved or denied determinations and any other requests to dedicated fax number below belonging to the campus where the patient is receiving treatment   List of dedicated fax numbers:  [address] DENIALS (Administrative/Medical Necessity) [phone]   [address] (Maternity/NICU/Pediatrics) [phone]   [facility] [phone]   [facility] 87 [phone]   [phone]   220 [facility] [phone]   90 [facility] [phone]   [address] [place], [place] [address] [phone]   [facility]  [phone]   4058 [facility] [phone]   412 [facility] 850 [address] [facility] [phone]

## 2023-06-01 ENCOUNTER — TELEPHONE (OUTPATIENT)
Dept: NEUROLOGY | Facility: CLINIC | Age: 33
End: 2023-06-01

## 2023-06-01 NOTE — TELEPHONE ENCOUNTER
1ST ATTEMPT,     Called pt no answer,, LMOM  If patient calls back we can offer an Appt with Henny at the [place] in July as that would be the time frame, Attending have no openings around 8 weeks  [facility]/ [facility]/ SEIZURE - LIKE ACTIVITY     DISCHARGED HOME - 05/10/2023  Tanya Landing will need follow up in 8 weeks with epilepsy attending or advance practitioner  He will not require outpatient neurological testing      Thank you,     Nilo Elise

## 2023-09-16 ENCOUNTER — HOSPITAL ENCOUNTER (EMERGENCY)
Facility: HOSPITAL | Age: 33
Discharge: LEFT BEFORE DISCHARGE | End: 2023-09-16
Payer: COMMERCIAL

## 2023-09-16 VITALS
SYSTOLIC BLOOD PRESSURE: 155 MMHG | RESPIRATION RATE: 20 BRPM | TEMPERATURE: 97.2 F | DIASTOLIC BLOOD PRESSURE: 93 MMHG | HEART RATE: 83 BPM | OXYGEN SATURATION: 97 %

## 2023-09-16 LAB
ALBUMIN SERPL BCP-MCNC: 3.8 G/DL (ref 3.5–5)
ALP SERPL-CCNC: 54 U/L (ref 34–104)
ALT SERPL W P-5'-P-CCNC: 16 U/L (ref 7–52)
ANION GAP SERPL CALCULATED.3IONS-SCNC: 4 MMOL/L
AST SERPL W P-5'-P-CCNC: 23 U/L (ref 13–39)
BASOPHILS # BLD AUTO: 0.09 THOUSANDS/ÂΜL (ref 0–0.1)
BASOPHILS NFR BLD AUTO: 1 % (ref 0–1)
BILIRUB SERPL-MCNC: 0.31 MG/DL (ref 0.2–1)
BUN SERPL-MCNC: 8 MG/DL (ref 5–25)
CALCIUM SERPL-MCNC: 8.9 MG/DL (ref 8.4–10.2)
CARDIAC TROPONIN I PNL SERPL HS: 4 NG/L
CHLORIDE SERPL-SCNC: 98 MMOL/L (ref 96–108)
CO2 SERPL-SCNC: 32 MMOL/L (ref 21–32)
CREAT SERPL-MCNC: 0.82 MG/DL (ref 0.6–1.3)
EOSINOPHIL # BLD AUTO: 0.48 THOUSAND/ÂΜL (ref 0–0.61)
EOSINOPHIL NFR BLD AUTO: 5 % (ref 0–6)
ERYTHROCYTE [DISTWIDTH] IN BLOOD BY AUTOMATED COUNT: 14.7 % (ref 11.6–15.1)
GFR SERPL CREATININE-BSD FRML MDRD: 117 ML/MIN/1.73SQ M
GLUCOSE SERPL-MCNC: 104 MG/DL (ref 65–140)
HCT VFR BLD AUTO: 41 % (ref 36.5–49.3)
HGB BLD-MCNC: 13.8 G/DL (ref 12–17)
IMM GRANULOCYTES # BLD AUTO: 0.14 THOUSAND/UL (ref 0–0.2)
IMM GRANULOCYTES NFR BLD AUTO: 1 % (ref 0–2)
INR PPP: 0.94 (ref 0.84–1.19)
LYMPHOCYTES # BLD AUTO: 2.62 THOUSANDS/ÂΜL (ref 0.6–4.47)
LYMPHOCYTES NFR BLD AUTO: 25 % (ref 14–44)
MCH RBC QN AUTO: 32.2 PG (ref 26.8–34.3)
MCHC RBC AUTO-ENTMCNC: 33.7 G/DL (ref 31.4–37.4)
MCV RBC AUTO: 96 FL (ref 82–98)
MONOCYTES # BLD AUTO: 0.99 THOUSAND/ÂΜL (ref 0.17–1.22)
MONOCYTES NFR BLD AUTO: 10 % (ref 4–12)
NEUTROPHILS # BLD AUTO: 6.12 THOUSANDS/ÂΜL (ref 1.85–7.62)
NEUTS SEG NFR BLD AUTO: 58 % (ref 43–75)
NRBC BLD AUTO-RTO: 0 /100 WBCS
PLATELET # BLD AUTO: 236 THOUSANDS/UL (ref 149–390)
PMV BLD AUTO: 9.1 FL (ref 8.9–12.7)
POTASSIUM SERPL-SCNC: 4 MMOL/L (ref 3.5–5.3)
PROT SERPL-MCNC: 6.7 G/DL (ref 6.4–8.4)
PROTHROMBIN TIME: 12.7 SECONDS (ref 11.6–14.5)
RBC # BLD AUTO: 4.29 MILLION/UL (ref 3.88–5.62)
SODIUM SERPL-SCNC: 134 MMOL/L (ref 135–147)
WBC # BLD AUTO: 10.44 THOUSAND/UL (ref 4.31–10.16)

## 2023-09-16 PROCEDURE — 85610 PROTHROMBIN TIME: CPT

## 2023-09-16 PROCEDURE — 93005 ELECTROCARDIOGRAM TRACING: CPT

## 2023-09-16 PROCEDURE — 85025 COMPLETE CBC W/AUTO DIFF WBC: CPT

## 2023-09-16 PROCEDURE — 84484 ASSAY OF TROPONIN QUANT: CPT

## 2023-09-16 PROCEDURE — 80053 COMPREHEN METABOLIC PANEL: CPT

## 2023-09-16 PROCEDURE — 36415 COLL VENOUS BLD VENIPUNCTURE: CPT

## 2023-09-17 LAB
ATRIAL RATE: 78 BPM
P AXIS: 62 DEGREES
PR INTERVAL: 176 MS
QRS AXIS: 87 DEGREES
QRSD INTERVAL: 88 MS
QT INTERVAL: 350 MS
QTC INTERVAL: 399 MS
T WAVE AXIS: 21 DEGREES
VENTRICULAR RATE: 78 BPM

## 2023-09-17 PROCEDURE — 93010 ELECTROCARDIOGRAM REPORT: CPT | Performed by: INTERNAL MEDICINE

## 2024-10-08 ENCOUNTER — APPOINTMENT (OUTPATIENT)
Dept: URGENT CARE | Facility: MEDICAL CENTER | Age: 34
End: 2024-10-08

## 2024-11-13 NOTE — ED PROVIDER NOTES
History  Chief Complaint   Patient presents with   • Seizure - Prior Hx Of     Pt had witnessed syncope/seizure activity lasting approx  5 min  Pt has not been taking klonopin as prescribed  Kate Milton is a 34 y o  male who presents via EMS for suspected seizure-like activity  His wife who is at bedside reports he was sitting in a chair on their front porch when he began to  both arm rests firmly and shake over his whole body  She reports the shaking was causing the entire porch to shake  She attempted to get him out of the chair but was unable to  She reports the shaking lasted for about 2 minutes  She says he was confused and somnolent after the presumed seizure and only really became coherent once in the back of the ambulance  Although nursing indicates that he has a prior history of seizures both he and his wife deny this  The patient reports he has been under a lot of stress as he just lost his job and that he has been weaning off of Klonopin  He is still taking suboxone for a history of substance abuse  Patient currently states he feels well and wants to go home but he is clammy, tachycardic and still slow with his answers  His wife confirms that he is not back to his normal mental baseline          History provided by:  Patient and medical records   used: No    Seizure - New Onset  Seizure activity on arrival: no    Seizure type:  Grand mal  Initial focality:  None  Episode characteristics: abnormal movements and unresponsiveness    Postictal symptoms: confusion    Return to baseline: no    Severity:  Moderate  Duration:  2 minutes  Timing:  Once  Number of seizures this episode:  1  Progression:  Resolved  Context: change in medication (weaning off of clonazepam) and emotional upset (recently lost his job)    Recent head injury:  No recent head injuries  PTA treatment:  None  History of seizures: no        Prior to Admission Medications   Prescriptions Last Dose Informant Patient Reported? Taking? albuterol (PROVENTIL HFA,VENTOLIN HFA) 90 mcg/act inhaler   Yes No   Sig: Inhale 2 puffs every 6 (six) hours as needed for wheezing   Patient not taking: Reported on 11/20/2021    amphetamine-dextroamphetamine (ADDERALL) 30 MG tablet   Yes No   Sig: Take 30 mg by mouth 2 (two) times a day   Patient not taking: Reported on 11/20/2021    buprenorphine (SUBUTEX) 8 mg 5/7/2023  Yes Yes   Sig: Place 8 mg under the tongue daily   clonazePAM (KlonoPIN) 1 mg tablet 5/6/2023  Yes Yes   Sig: Take 1 mg by mouth daily as needed for seizures   ondansetron (ZOFRAN-ODT) 4 mg disintegrating tablet   No No   Sig: Take 1 tablet (4 mg total) by mouth every 6 (six) hours as needed for nausea or vomiting   rOPINIRole (REQUIP) 0 5 mg tablet Not Taking  No No   Sig: Take 1 tablet (0 5 mg total) by mouth daily at bedtime   Patient not taking: Reported on 5/7/2023      Facility-Administered Medications: None       Past Medical History:   Diagnosis Date   • ADHD (attention deficit hyperactivity disorder)    • Anxiety    • Asthma        History reviewed  No pertinent surgical history  Family History   Problem Relation Age of Onset   • Heart disease Father    • Hypertension Father    • Hodgkin's lymphoma Father    • Hypertension Paternal Grandfather    • No Known Problems Mother      I have reviewed and agree with the history as documented  E-Cigarette/Vaping   • E-Cigarette Use Never User      E-Cigarette/Vaping Substances   • Nicotine No    • THC No    • CBD No    • Flavoring No    • Other No    • Unknown No      Social History     Tobacco Use   • Smoking status: Every Day     Packs/day: 0 50     Years: 7 00     Pack years: 3 50     Types: Cigarettes   • Smokeless tobacco: Never   Vaping Use   • Vaping Use: Never used   Substance Use Topics   • Alcohol use: Yes     Comment: weekends   • Drug use: Yes     Types: Marijuana     Comment: OCCASIONAL          Review of Systems   Constitutional: Positive for diaphoresis  Negative for chills and fever  Respiratory: Negative for shortness of breath  Cardiovascular: Negative for chest pain and palpitations  Gastrointestinal: Negative for diarrhea, nausea and vomiting  Genitourinary: Negative for dysuria and frequency  Skin: Negative for rash  Neurological: Positive for seizures  Psychiatric/Behavioral: Positive for confusion  All other systems reviewed and are negative  Physical Exam  Physical Exam  Vitals and nursing note reviewed  Constitutional:       General: He is in acute distress  Appearance: He is well-developed  He is diaphoretic (clammy)  HENT:      Head: Normocephalic  Right Ear: External ear normal       Left Ear: External ear normal       Nose: Nose normal    Eyes:      General: Lids are normal       Extraocular Movements: Extraocular movements intact  Comments: Dilated pupils bilaterally, reactive to light, equal, round   Cardiovascular:      Rate and Rhythm: Tachycardia present  Pulmonary:      Effort: Pulmonary effort is normal  No respiratory distress  Musculoskeletal:         General: No deformity  Normal range of motion  Cervical back: Normal range of motion and neck supple  Skin:     General: Skin is warm  Neurological:      General: No focal deficit present  Mental Status: He is alert and oriented to person, place, and time  Psychiatric:         Speech: Speech is delayed           Vital Signs  ED Triage Vitals   Temperature Pulse Respirations Blood Pressure SpO2   05/07/23 2116 05/07/23 2111 05/07/23 2111 05/07/23 2111 05/07/23 2111   98 3 °F (36 8 °C) (!) 119 20 129/65 95 %      Temp Source Heart Rate Source Patient Position - Orthostatic VS BP Location FiO2 (%)   05/07/23 2116 05/07/23 2111 05/07/23 2235 05/07/23 2235 --   Oral Monitor Lying Left arm       Pain Score       --                  Vitals:    05/07/23 2111 05/07/23 2235 05/07/23 2300 05/08/23 0000   BP: 129/65 120/64 123/68 113/54 Pulse: (!) 119 80 (!) 107 76   Patient Position - Orthostatic VS:  Lying Lying Lying         Visual Acuity  Visual Acuity    Flowsheet Row Most Recent Value   L Pupil Size (mm) 5   R Pupil Size (mm) 5          ED Medications  Medications   sodium chloride 0 9 % bolus 1,000 mL (1,000 mL Intravenous New Bag 5/7/23 2232)   ondansetron (ZOFRAN) injection 4 mg (4 mg Intravenous Given 5/7/23 2233)   levETIRAcetam (KEPPRA) 1,500 mg in sodium chloride 0 9 % 100 mL IVPB (0 mg Intravenous Stopped 5/8/23 0033)       Diagnostic Studies  Results Reviewed     Procedure Component Value Units Date/Time    HS Troponin I 2hr [[id number]]  (Abnormal) Collected: 05/07/23 2329    Lab Status: Final result Specimen: Blood from Arm, Right Updated: 05/08/23 0009     hs TnI 2hr 172 ng/L      Delta 2hr hsTnI 42 ng/L     Rapid drug screen, urine [[id number]]  (Abnormal) Collected: 05/07/23 2326    Lab Status: Final result Specimen: Urine, Other Updated: 05/07/23 2356     Amph/Meth UR Positive     Barbiturate Ur Negative     Benzodiazepine Urine Positive     Cocaine Urine Positive     Methadone Urine Negative     Opiate Urine Negative     PCP Ur Positive     THC Urine Positive     Oxycodone Urine Negative    Narrative:      Presumptive report  If requested, specimen will be sent to reference lab for confirmation  FOR MEDICAL PURPOSES ONLY  IF CONFIRMATION NEEDED PLEASE CONTACT THE LAB WITHIN 5 DAYS      Drug Screen Cutoff Levels:  AMPHETAMINE/METHAMPHETAMINES  1000 ng/mL  BARBITURATES     200 ng/mL  BENZODIAZEPINES     200 ng/mL  COCAINE      300 ng/mL  METHADONE      300 ng/mL  OPIATES      300 ng/mL  PHENCYCLIDINE     25 ng/mL  THC       50 ng/mL  OXYCODONE      100 ng/mL    HS Troponin I 4hr [[id number]]     Lab Status: No result Specimen: Blood     Salicylate level [[id number]]  (Normal) Collected: 05/07/23 2222    Lab Status: Final result Specimen: Blood from Arm, Right Updated: 50/75/46 1311     Salicylate Lvl <5 mg/dL     Acetaminophen level-If concentration is detectable, please discuss with medical  on call   [[id number]]  (Abnormal) Collected: 05/07/23 2222    Lab Status: Final result Specimen: Blood from Arm, Right Updated: 05/07/23 2249     Acetaminophen Level <10 ug/mL     Ethanol [[id number]]  (Normal) Collected: 05/07/23 2222    Lab Status: Final result Specimen: Blood from Arm, Right Updated: 05/07/23 2249     Ethanol Lvl <10 mg/dL     HS Troponin 0hr (reflex protocol) [[id number]]  (Abnormal) Collected: 05/07/23 2120    Lab Status: Final result Specimen: Blood from Arm, Right Updated: 05/07/23 2156     hs TnI 0hr 130 ng/L     Comprehensive metabolic panel [[id number]]  (Abnormal) Collected: 05/07/23 2120    Lab Status: Final result Specimen: Blood from Arm, Right Updated: 05/07/23 2148     Sodium 138 mmol/L      Potassium 3 8 mmol/L      Chloride 100 mmol/L      CO2 30 mmol/L      ANION GAP 8 mmol/L      BUN 20 mg/dL      Creatinine 1 20 mg/dL      Glucose 89 mg/dL      Calcium 9 5 mg/dL      AST 52 U/L      ALT 51 U/L      Alkaline Phosphatase 75 U/L      Total Protein 7 4 g/dL      Albumin 4 3 g/dL      Total Bilirubin 0 37 mg/dL      eGFR 80 ml/min/1 73sq m     Narrative:      Davie guidelines for Chronic Kidney Disease (CKD):   •  Stage 1 with normal or high GFR (GFR > 90 mL/min/1 73 square meters)  •  Stage 2 Mild CKD (GFR = 60-89 mL/min/1 73 square meters)  •  Stage 3A Moderate CKD (GFR = 45-59 mL/min/1 73 square meters)  •  Stage 3B Moderate CKD (GFR = 30-44 mL/min/1 73 square meters)  •  Stage 4 Severe CKD (GFR = 15-29 mL/min/1 73 square meters)  •  Stage 5 End Stage CKD (GFR <15 mL/min/1 73 square meters)  Note: GFR calculation is accurate only with a steady state creatinine    CBC and differential [[id number]] Collected: 05/07/23 2120    Lab Status: Final result Specimen: Blood from Arm, Right Updated: 05/07/23 2126     WBC 10 07 Thousand/uL      RBC 5 05 Million/uL      Hemoglobin 15 0 g/dL Hematocrit 45 7 %      MCV 91 fL      MCH 29 7 pg      MCHC 32 8 g/dL      RDW 12 7 %      MPV 9 8 fL      Platelets 124 Thousands/uL      nRBC 0 /100 WBCs      Neutrophils Relative 65 %      Immat GRANS % 0 %      Lymphocytes Relative 23 %      Monocytes Relative 7 %      Eosinophils Relative 4 %      Basophils Relative 1 %      Neutrophils Absolute 6 57 Thousands/µL      Immature Grans Absolute 0 03 Thousand/uL      Lymphocytes Absolute 2 36 Thousands/µL      Monocytes Absolute 0 69 Thousand/µL      Eosinophils Absolute 0 36 Thousand/µL      Basophils Absolute 0 06 Thousands/µL     Fingerstick Glucose (POCT) [[id number]]  (Normal) Collected: 05/07/23 2110    Lab Status: Final result Updated: 05/07/23 2111     POC Glucose 86 mg/dl                  CT head without contrast   Final Result by Heather Weber MD (05/07 2336)      No acute intracranial hemorrhage, midline shift, or mass effect  Workstation performed: [id number]         XR chest 1 view portable   ED Interpretation by Miriam Lynn MD (05/07 5754)   This film was interpreted independently by me  No infiltrate, cardiac silhouette normal, no pleural effusions or pulmonary edema  Procedures  Procedures         ED Course  ED Course as of 05/08/23 0039   Mon May 08, 2023   Mc Anderson is refusing admission  I explained to him that he has an elevated troponin with rising levels indicating that he has damaged his heart  He insists that he is fine  We got him up for an ambulatory test and he had a steady but wide based gait  He is able to answer all orientation questions appropriately  He is adamantly refusing admission  I stressed to him that there is a real possibility of death or disability and that I believe he is making a mistake by leaving the hospital   He says that he understands and still wishes to leave against medical advice    This conversation was held in concert with his wife who was also attempting to talk the patient into staying  Medical Decision Making  Background: 34 y o  male presents with presumed new onset seizure    Differential DX includes but is not limited to: benzodiazepene withdrawal, other drug withdrawal, toxic effects of other drug ingestion, metabolic derangement, less likely ich, neoplasm    Plan: cbc, cmp, uds, coma panel, ct head, symptomatic treatment       Amount and/or Complexity of Data Reviewed  Labs: ordered  Radiology: ordered  Risk  Prescription drug management  Disposition  Final diagnoses:   Seizure ([facility] )   Polysubstance abuse ([place] [address] )   NSTEMI (non-ST elevated myocardial infarction) ([place] [address] )     Time reflects when diagnosis was documented in both MDM as applicable and the Disposition within this note     Time User Action Codes Description Comment    5/8/2023 12:01 AM Keyla MCCALL Add [R56 9] Seizure ([place] [address] )     5/8/2023 12:02 AM Ayan Martins Add [F19 10] Polysubstance abuse ([place] [address] )     5/8/2023 12:37 AM Keyla MCCALL Add [I21 4] NSTEMI (non-ST elevated myocardial infarction) [facility])       ED Disposition     ED Disposition   AMA    Condition   --    Date/Time   Mon May 8, 2023 12:36 AM    Comment              Follow-up Information    None         Patient's Medications   Discharge Prescriptions    No medications on file       No discharge procedures on file      PDMP Review       Value Time User    PDMP Reviewed  Yes 11/3/2021 10:42 AM Mehreen Sorto MD          ED Provider  Electronically Signed by           Benito Nicholas MD  05/08/23 Ban Walton MD  05/08/23 7715 Quality 130: Documentation Of Current Medications In The Medical Record: Current Medications Documented Quality 47: Advance Care Plan: Advance care planning not documented, reason not otherwise specified. Quality 226: Preventive Care And Screening: Tobacco Use: Screening And Cessation Intervention: Patient screened for tobacco use and is an ex/non-smoker Detail Level: Detailed

## 2025-06-17 ENCOUNTER — HOSPITAL ENCOUNTER (EMERGENCY)
Facility: HOSPITAL | Age: 35
Discharge: HOME | End: 2025-06-17
Attending: EMERGENCY MEDICINE | Admitting: EMERGENCY MEDICINE
Payer: COMMERCIAL

## 2025-06-17 VITALS
HEART RATE: 70 BPM | DIASTOLIC BLOOD PRESSURE: 91 MMHG | RESPIRATION RATE: 18 BRPM | OXYGEN SATURATION: 100 % | TEMPERATURE: 98.2 F | SYSTOLIC BLOOD PRESSURE: 141 MMHG

## 2025-06-17 DIAGNOSIS — R22.32 AXILLARY MASS, LEFT: Primary | ICD-10-CM

## 2025-06-17 LAB
ANION GAP SERPL CALCULATED.3IONS-SCNC: 6 MMOL/L (ref 4–13)
BASOPHILS # BLD AUTO: 0.04 THOUSANDS/ÂΜL (ref 0–0.1)
BASOPHILS NFR BLD AUTO: 1 % (ref 0–1)
BUN SERPL-MCNC: 11 MG/DL (ref 5–25)
CALCIUM SERPL-MCNC: 9.3 MG/DL (ref 8.4–10.2)
CHLORIDE SERPL-SCNC: 100 MMOL/L (ref 96–108)
CO2 SERPL-SCNC: 30 MMOL/L (ref 21–32)
CREAT SERPL-MCNC: 0.88 MG/DL (ref 0.6–1.3)
EOSINOPHIL # BLD AUTO: 0.29 THOUSAND/ÂΜL (ref 0–0.61)
EOSINOPHIL NFR BLD AUTO: 3 % (ref 0–6)
ERYTHROCYTE [DISTWIDTH] IN BLOOD BY AUTOMATED COUNT: 13.2 % (ref 11.6–15.1)
GFR SERPL CREATININE-BSD FRML MDRD: 112 ML/MIN/1.73SQ M
GLUCOSE SERPL-MCNC: 90 MG/DL (ref 65–140)
HCT VFR BLD AUTO: 43.2 % (ref 36.5–49.3)
HGB BLD-MCNC: 14.8 G/DL (ref 12–17)
IMM GRANULOCYTES # BLD AUTO: 0.02 THOUSAND/UL (ref 0–0.2)
IMM GRANULOCYTES NFR BLD AUTO: 0 % (ref 0–2)
LYMPHOCYTES # BLD AUTO: 2.61 THOUSANDS/ÂΜL (ref 0.6–4.47)
LYMPHOCYTES NFR BLD AUTO: 31 % (ref 14–44)
MCH RBC QN AUTO: 30.8 PG (ref 26.8–34.3)
MCHC RBC AUTO-ENTMCNC: 34.3 G/DL (ref 31.4–37.4)
MCV RBC AUTO: 90 FL (ref 82–98)
MONOCYTES # BLD AUTO: 0.92 THOUSAND/ÂΜL (ref 0.17–1.22)
MONOCYTES NFR BLD AUTO: 11 % (ref 4–12)
NEUTROPHILS # BLD AUTO: 4.69 THOUSANDS/ÂΜL (ref 1.85–7.62)
NEUTS SEG NFR BLD AUTO: 54 % (ref 43–75)
NRBC BLD AUTO-RTO: 0 /100 WBCS
PLATELET # BLD AUTO: 231 THOUSANDS/UL (ref 149–390)
PMV BLD AUTO: 9.3 FL (ref 8.9–12.7)
POTASSIUM SERPL-SCNC: 3.7 MMOL/L (ref 3.5–5.3)
RBC # BLD AUTO: 4.8 MILLION/UL (ref 3.88–5.62)
SODIUM SERPL-SCNC: 136 MMOL/L (ref 135–147)
WBC # BLD AUTO: 8.57 THOUSAND/UL (ref 4.31–10.16)

## 2025-06-17 PROCEDURE — 85025 COMPLETE CBC W/AUTO DIFF WBC: CPT | Performed by: EMERGENCY MEDICINE

## 2025-06-17 PROCEDURE — 99284 EMERGENCY DEPT VISIT MOD MDM: CPT | Performed by: EMERGENCY MEDICINE

## 2025-06-17 PROCEDURE — 80048 BASIC METABOLIC PNL TOTAL CA: CPT | Performed by: EMERGENCY MEDICINE

## 2025-06-17 PROCEDURE — 99282 EMERGENCY DEPT VISIT SF MDM: CPT

## 2025-06-17 PROCEDURE — 10060 I&D ABSCESS SIMPLE/SINGLE: CPT | Performed by: EMERGENCY MEDICINE

## 2025-06-17 PROCEDURE — 36415 COLL VENOUS BLD VENIPUNCTURE: CPT | Performed by: EMERGENCY MEDICINE

## 2025-06-17 RX ORDER — SULFAMETHOXAZOLE AND TRIMETHOPRIM 800; 160 MG/1; MG/1
1 TABLET ORAL 2 TIMES DAILY
Qty: 10 TABLET | Refills: 0 | Status: SHIPPED | OUTPATIENT
Start: 2025-06-17 | End: 2025-06-22

## 2025-06-17 RX ORDER — SULFAMETHOXAZOLE AND TRIMETHOPRIM 800; 160 MG/1; MG/1
1 TABLET ORAL ONCE
Status: COMPLETED | OUTPATIENT
Start: 2025-06-17 | End: 2025-06-17

## 2025-06-17 RX ORDER — ALPRAZOLAM 0.5 MG
0.5 TABLET ORAL 3 TIMES DAILY PRN
COMMUNITY

## 2025-06-17 RX ORDER — GINSENG 100 MG
1 CAPSULE ORAL ONCE
Status: COMPLETED | OUTPATIENT
Start: 2025-06-17 | End: 2025-06-17

## 2025-06-17 RX ADMIN — SULFAMETHOXAZOLE AND TRIMETHOPRIM 1 TABLET: 800; 160 TABLET ORAL at 21:20

## 2025-06-17 RX ADMIN — BACITRACIN 1 LARGE APPLICATION: 500 OINTMENT TOPICAL at 22:29

## 2025-06-17 NOTE — Clinical Note
Toby Montgomery was seen and treated in our emergency department on 6/17/2025.                Diagnosis: Private Luis  may return to work on return date.    He may return on this date: 06/23/2025         If you have any questions or concerns, please don't hesitate to call.      Bc Farnsworth, DO    ______________________________           _______________          _______________  Hospital Representative                              Date                                Time

## 2025-06-18 NOTE — ED PROVIDER NOTES
Time reflects when diagnosis was documented in both MDM as applicable and the Disposition within this note       Time User Action Codes Description Comment    6/17/2025  9:52 PM Bc Farnsworth Add [R22.31] Axillary mass, right     6/17/2025  9:52 PM Bc Farnsworth Remove [R22.31] Axillary mass, right     6/17/2025  9:52 PM Bc Farnsworth Add [R22.32] Axillary mass, left           ED Disposition       ED Disposition   Discharge    Condition   Stable    Date/Time   Tue Jun 17, 2025  9:52 PM    Comment   Toby Montgomery discharge to home/self care.                   Assessment & Plan       Medical Decision Making  Patient with a 4 x 2 cm swelling within the axilla.  There is only mild erythema overlying with no increased warmth.  Patient has stabbed the mass a couple times already tonight.    Patient has no B symptoms.  No cancer symptoms.    Differential including but not limited to lymphadenopathy, axillary mass, abscess.    I performed a limited ultrasound which showed some fluid within the area approximately half centimeter by half centimeter as well as some changes consistent with possible cellulitis.  Is possible the fluid is blood from patient previously stabbing the area also possible that it is pus.  Images were not saved secondary to problem with the machine.  There is no color flow through this area.    I performed a limited I&D with an 11 blade.  Only blood drained from it.     More likely lymphadenopathy then abscess but will place on antibiotic.  Recommend follow-up with PCP as soon as possible for possible biopsy to rule out lymphoma.    Blood work was performed showing no signs of blood dyscrasia.      Return precautions given.      Problems Addressed:  Axillary mass, left: acute illness or injury    Amount and/or Complexity of Data Reviewed  Labs: ordered.    Risk  OTC drugs.  Prescription drug management.             Medications   sulfamethoxazole-trimethoprim (BACTRIM DS) 800-160 mg per  tablet 1 tablet (1 tablet Oral Given 6/17/25 2120)   bacitracin topical ointment 1 large application (1 large application Topical Given 6/17/25 2229)       ED Risk Strat Scores                    No data recorded                            History of Present Illness       Chief Complaint   Patient presents with    Cyst     Pt reports a cyst formed underneath left armpit about a week ago.        Past Medical History[1]   Past Surgical History[2]   Family History[3]   Social History[4]   E-Cigarette/Vaping    E-Cigarette Use Never User       E-Cigarette/Vaping Substances    Nicotine No     THC No     CBD No     Flavoring No     Other No     Unknown No       I have reviewed and agree with the history as documented.     35-year-old male previous history of asthma, psychiatric disorders presents for axillary swelling.    Patient reports 1 week of swelling within the left axilla.  Denies rash, infection of the left arm, fevers, night sweats, weight loss.    He stabbed the swelling with a needle 4 times and it produced some blood but no pus.  Minimal overlying erythema.    Patient has no cancer history.    He denies any swelling anywhere else.              Review of Systems   Skin:         Left axillary mass.   All other systems reviewed and are negative.          Objective       ED Triage Vitals [06/17/25 2039]   Temperature Pulse Blood Pressure Respirations SpO2 Patient Position - Orthostatic VS   98.2 °F (36.8 °C) 70 141/91 18 100 % Sitting      Temp Source Heart Rate Source BP Location FiO2 (%) Pain Score    Oral Monitor Right arm -- --      Vitals      Date and Time Temp Pulse SpO2 Resp BP Pain Score FACES Pain Rating User   06/17/25 2039 98.2 °F (36.8 °C) 70 100 % 18 141/91 -- -- RN            Physical Exam  Vitals and nursing note reviewed.   Constitutional:       General: He is not in acute distress.     Appearance: He is well-developed. He is not diaphoretic.   HENT:      Head: Normocephalic and atraumatic.       Right Ear: External ear normal.      Left Ear: External ear normal.     Eyes:      Conjunctiva/sclera: Conjunctivae normal.     Neck:      Trachea: No tracheal deviation.     Cardiovascular:      Rate and Rhythm: Normal rate and regular rhythm.      Heart sounds: Normal heart sounds. No murmur heard.  Pulmonary:      Effort: No respiratory distress.      Breath sounds: Normal breath sounds. No stridor. No wheezing or rales.   Abdominal:      General: Bowel sounds are normal. There is no distension.      Palpations: Abdomen is soft. There is no mass.      Tenderness: There is no abdominal tenderness. There is no guarding or rebound.     Musculoskeletal:         General: Swelling and tenderness present. No deformity.      Comments: 4 x 2 cm swelling within the left axillary region with minimal erythema.  No increased warmth.  No fluctuance.    No cervical or inguinal ligament lymphadenopathy.  No right axillary lymphadenopathy.     Skin:     General: Skin is dry.      Findings: No rash.     Neurological:      Motor: No abnormal muscle tone.      Coordination: Coordination normal.     Psychiatric:         Behavior: Behavior normal.         Thought Content: Thought content normal.         Judgment: Judgment normal.         Results Reviewed       Procedure Component Value Units Date/Time    Basic metabolic panel [[id number]] Collected: 06/17/25 2120    Lab Status: Final result Specimen: Blood from Arm, Right Updated: 06/17/25 2142     Sodium 136 mmol/L      Potassium 3.7 mmol/L      Chloride 100 mmol/L      CO2 30 mmol/L      ANION GAP 6 mmol/L      BUN 11 mg/dL      Creatinine 0.88 mg/dL      Glucose 90 mg/dL      Calcium 9.3 mg/dL      eGFR 112 ml/min/1.73sq m     Narrative:      [employer] guidelines for Chronic Kidney Disease (CKD):     Stage 1 with normal or high GFR (GFR > 90 mL/min/1.73 square meters)    Stage 2 Mild CKD (GFR = 60-89 mL/min/1.73 square meters)    Stage 3A Moderate CKD (GFR =  "45-59 mL/min/1.73 square meters)    Stage 3B Moderate CKD (GFR = 30-44 mL/min/1.73 square meters)    Stage 4 Severe CKD (GFR = 15-29 mL/min/1.73 square meters)    Stage 5 End Stage CKD (GFR <15 mL/min/1.73 square meters)  Note: GFR calculation is accurate only with a steady state creatinine    CBC and differential [[id number]] Collected: 06/17/25 2120    Lab Status: Final result Specimen: Blood from Arm, Right Updated: 06/17/25 2126     WBC 8.57 Thousand/uL      RBC 4.80 Million/uL      Hemoglobin 14.8 g/dL      Hematocrit 43.2 %      MCV 90 fL      MCH 30.8 pg      MCHC 34.3 g/dL      RDW 13.2 %      MPV 9.3 fL      Platelets 231 Thousands/uL      nRBC 0 /100 WBCs      Segmented % 54 %      Immature Grans % 0 %      Lymphocytes % 31 %      Monocytes % 11 %      Eosinophils Relative 3 %      Basophils Relative 1 %      Absolute Neutrophils 4.69 Thousands/µL      Absolute Immature Grans 0.02 Thousand/uL      Absolute Lymphocytes 2.61 Thousands/µL      Absolute Monocytes 0.92 Thousand/µL      Eosinophils Absolute 0.29 Thousand/µL      Basophils Absolute 0.04 Thousands/µL             No orders to display       Incision and drain    Date/Time: 6/17/2025 9:00 PM    Performed by: Bc Farnsworth DO  Authorized by: Bc Farnsworth DO    Universal Protocol:  procedure performed by consultantConsent: The procedure was performed in an emergent situation  Consent given by: patient  Time out: Immediately prior to procedure a \"time out\" was called to verify the correct patient, procedure, equipment, support staff and site/side marked as required.  Patient identity confirmed: verbally with patient    Patient location:  ED  Location:     Type:  Abscess    Size:  4x2    Location: L axilla.  Pre-procedure details:     Skin preparation:  Chloraprep  Anesthesia (see MAR for exact dosages):     Anesthesia method:  Local infiltration    Local anesthetic:  Ropivacaine 0.5%  Procedure details:     Complexity:  Simple    Incision " types:  Stab incision    Scalpel blade:  11    Approach:  Open    Incision depth:  Subcutaneous    Wound management:  Irrigated with saline    Irrigation with saline:  Yes    Drainage:  Bloody    Drainage amount:  Scant    Wound treatment:  Wound left open      ED Medication and Procedure Management   Prior to Admission Medications   Prescriptions Last Dose Informant Patient Reported? Taking?   ALPRAZolam (XANAX) 0.5 mg tablet 6/16/2025  Yes Yes   Sig: Take 0.5 mg by mouth 3 (three) times a day as needed for anxiety   buprenorphine (SUBUTEX) 8 mg 6/16/2025  Yes Yes   Sig: Place 8 mg under the tongue in the morning and 8 mg before bedtime.   clonazePAM (KlonoPIN) 1 mg tablet Not Taking  Yes No   Sig: Take 1 mg by mouth 2 (two) times a day   Patient not taking: Reported on 6/17/2025   ondansetron (ZOFRAN-ODT) 4 mg disintegrating tablet Not Taking  No No   Sig: Take 1 tablet (4 mg total) by mouth every 6 (six) hours as needed for nausea or vomiting   Patient not taking: Reported on 6/17/2025      Facility-Administered Medications: None     Discharge Medication List as of 6/17/2025  9:55 PM        START taking these medications    Details   sulfamethoxazole-trimethoprim (BACTRIM DS) 800-160 mg per tablet Take 1 tablet by mouth 2 (two) times a day for 5 days smx-tmp DS (BACTRIM) 800-160 mg tabs (1tab q12 D10), Starting Tue 6/17/2025, Until Sun 6/22/2025, Normal           CONTINUE these medications which have NOT CHANGED    Details   ALPRAZolam (XANAX) 0.5 mg tablet Take 0.5 mg by mouth 3 (three) times a day as needed for anxiety, Historical Med      buprenorphine (SUBUTEX) 8 mg Place 8 mg under the tongue in the morning and 8 mg before bedtime., Historical Med      clonazePAM (KlonoPIN) 1 mg tablet Take 1 mg by mouth 2 (two) times a day, Historical Med      ondansetron (ZOFRAN-ODT) 4 mg disintegrating tablet Take 1 tablet (4 mg total) by mouth every 6 (six) hours as needed for nausea or vomiting, Starting Sat 11/20/2021,  Normal           No discharge procedures on file.  ED SEPSIS DOCUMENTATION   Time reflects when diagnosis was documented in both MDM as applicable and the Disposition within this note       Time User Action Codes Description Comment    6/17/2025  9:52 PM Bc Farnsworth Add [R22.31] Axillary mass, right     6/17/2025  9:52 PM Bc Farnsworth Remove [R22.31] Axillary mass, right     6/17/2025  9:52 PM Bc Farnsworth Add [R22.32] Axillary mass, left                      [1]   Past Medical History:  Diagnosis Date    ADHD (attention deficit hyperactivity disorder)     Anxiety     Asthma    [2] No past surgical history on file.  [3]   Family History  Problem Relation Name Age of Onset    Heart disease Father      Hypertension Father      Hodgkin's lymphoma Father      Hypertension Paternal Grandfather      No Known Problems Mother     [4]   Social History  Tobacco Use    Smoking status: Every Day     Current packs/day: 0.50     Average packs/day: 0.5 packs/day for 7.0 years (3.5 ttl pk-yrs)     Types: Cigarettes    Smokeless tobacco: Never   Vaping Use    Vaping status: Never Used   Substance Use Topics    Alcohol use: Yes     Comment: weekends    Drug use: Yes     Types: Marijuana     Comment: OCCASIONAL.         Bc Farnsworth DO  06/17/25 [id number]

## 2025-06-18 NOTE — DISCHARGE INSTRUCTIONS
I believe your exam is most consistent with an enlarged lymph node in your left armpit.  Also possible this could be a infection.    Work shows no signs of blood cancer.    Take the antibiotic as prescribed for the next 7 days.    Come back for new or worsening symptoms including but not limited to fevers, spreading redness, swelling in other parts of your body.    Follow-up with your primary care provider for recheck of the mass to see if they want to take a biopsy in the future.  Most enlarged lymph nodes decrease in size over 4 weeks.